# Patient Record
Sex: FEMALE | Race: WHITE | NOT HISPANIC OR LATINO | Employment: OTHER | URBAN - METROPOLITAN AREA
[De-identification: names, ages, dates, MRNs, and addresses within clinical notes are randomized per-mention and may not be internally consistent; named-entity substitution may affect disease eponyms.]

---

## 2017-01-14 ENCOUNTER — HOSPITAL ENCOUNTER (EMERGENCY)
Facility: HOSPITAL | Age: 52
Discharge: HOME | End: 2017-01-14
Attending: EMERGENCY MEDICINE
Payer: COMMERCIAL

## 2017-01-14 ENCOUNTER — APPOINTMENT (EMERGENCY)
Dept: RADIOLOGY | Facility: HOSPITAL | Age: 52
End: 2017-01-14
Payer: COMMERCIAL

## 2017-01-14 VITALS
WEIGHT: 155.2 LBS | SYSTOLIC BLOOD PRESSURE: 164 MMHG | DIASTOLIC BLOOD PRESSURE: 96 MMHG | OXYGEN SATURATION: 95 % | TEMPERATURE: 97.5 F | RESPIRATION RATE: 18 BRPM | HEART RATE: 100 BPM

## 2017-01-14 DIAGNOSIS — J01.90 ACUTE SINUSITIS: Primary | ICD-10-CM

## 2017-01-14 LAB — S PYO AG THROAT QL: NEGATIVE

## 2017-01-14 PROCEDURE — 87070 CULTURE OTHR SPECIMN AEROBIC: CPT | Performed by: PHYSICIAN ASSISTANT

## 2017-01-14 PROCEDURE — 87430 STREP A AG IA: CPT | Performed by: PHYSICIAN ASSISTANT

## 2017-01-14 PROCEDURE — 71020 HB CHEST X-RAY 2VW FRONTAL&LATL: CPT

## 2017-01-14 PROCEDURE — 99283 EMERGENCY DEPT VISIT LOW MDM: CPT

## 2017-01-14 RX ORDER — FLUTICASONE PROPIONATE 50 MCG
1 SPRAY, SUSPENSION (ML) NASAL DAILY
Qty: 1 BOTTLE | Refills: 0 | Status: ON HOLD | OUTPATIENT
Start: 2017-01-14 | End: 2017-11-15

## 2017-01-14 RX ORDER — AMOXICILLIN AND CLAVULANATE POTASSIUM 875; 125 MG/1; MG/1
1 TABLET, FILM COATED ORAL 2 TIMES DAILY
Qty: 10 TABLET | Refills: 0 | Status: SHIPPED | OUTPATIENT
Start: 2017-01-14 | End: 2017-01-19

## 2017-01-14 RX ORDER — IBUPROFEN 400 MG/1
400 TABLET ORAL EVERY 6 HOURS PRN
Qty: 20 TABLET | Refills: 0 | Status: ON HOLD | OUTPATIENT
Start: 2017-01-14 | End: 2017-11-15

## 2017-01-14 RX ORDER — GUAIFENESIN 600 MG
600 TABLET, EXTENDED RELEASE 12 HR ORAL EVERY 12 HOURS
Qty: 8 TABLET | Refills: 0 | Status: ON HOLD | OUTPATIENT
Start: 2017-01-14 | End: 2017-11-15

## 2017-01-14 RX ORDER — IBUPROFEN 200 MG
400 TABLET ORAL ONCE
Status: COMPLETED | OUTPATIENT
Start: 2017-01-14 | End: 2017-01-14

## 2017-01-14 RX ORDER — GUAIFENESIN 600 MG
600 TABLET, EXTENDED RELEASE 12 HR ORAL ONCE
Status: COMPLETED | OUTPATIENT
Start: 2017-01-14 | End: 2017-01-14

## 2017-01-14 RX ADMIN — IBUPROFEN 400 MG: 200 TABLET, FILM COATED ORAL at 16:32

## 2017-01-14 RX ADMIN — GUAIFENESIN 600 MG: 600 TABLET, EXTENDED RELEASE ORAL at 16:32

## 2017-01-16 LAB — BACTERIA THROAT CULT: NORMAL

## 2017-04-06 ENCOUNTER — ALLSCRIPTS OFFICE VISIT (OUTPATIENT)
Dept: OTHER | Facility: OTHER | Age: 52
End: 2017-04-06

## 2017-04-06 LAB — S PYO AG THROAT QL: POSITIVE

## 2017-04-10 ENCOUNTER — ALLSCRIPTS OFFICE VISIT (OUTPATIENT)
Dept: OTHER | Facility: OTHER | Age: 52
End: 2017-04-10

## 2017-07-07 ENCOUNTER — ALLSCRIPTS OFFICE VISIT (OUTPATIENT)
Dept: OTHER | Facility: OTHER | Age: 52
End: 2017-07-07

## 2017-07-07 LAB
BILIRUB UR QL STRIP: 0
CLARITY UR: NORMAL
COLOR UR: YELLOW
GLUCOSE (HISTORICAL): 0
HGB UR QL STRIP.AUTO: 0
KETONES UR STRIP-MCNC: 0 MG/DL
LEUKOCYTE ESTERASE UR QL STRIP: 0
NITRITE UR QL STRIP: 0
PH UR STRIP.AUTO: 6 [PH]
PROT UR STRIP-MCNC: 0 MG/DL
SP GR UR STRIP.AUTO: 1.01
UROBILINOGEN UR QL STRIP.AUTO: 0

## 2017-07-19 ENCOUNTER — ALLSCRIPTS OFFICE VISIT (OUTPATIENT)
Dept: OTHER | Facility: OTHER | Age: 52
End: 2017-07-19

## 2017-08-01 ENCOUNTER — ALLSCRIPTS OFFICE VISIT (OUTPATIENT)
Dept: OTHER | Facility: OTHER | Age: 52
End: 2017-08-01

## 2017-08-23 ENCOUNTER — ALLSCRIPTS OFFICE VISIT (OUTPATIENT)
Dept: OTHER | Facility: OTHER | Age: 52
End: 2017-08-23

## 2017-08-30 ENCOUNTER — TRANSCRIBE ORDERS (OUTPATIENT)
Dept: ADMINISTRATIVE | Facility: HOSPITAL | Age: 52
End: 2017-08-30

## 2017-08-30 ENCOUNTER — ALLSCRIPTS OFFICE VISIT (OUTPATIENT)
Dept: OTHER | Facility: OTHER | Age: 52
End: 2017-08-30

## 2017-08-30 DIAGNOSIS — R10.9 ABDOMINAL PAIN, UNSPECIFIED SITE: Primary | ICD-10-CM

## 2017-08-30 DIAGNOSIS — R10.9 ABDOMINAL PAIN: ICD-10-CM

## 2017-08-30 DIAGNOSIS — F30.9 MANIC EPISODE (HCC): ICD-10-CM

## 2017-09-05 ENCOUNTER — TRANSCRIBE ORDERS (OUTPATIENT)
Dept: LAB | Facility: CLINIC | Age: 52
End: 2017-09-05

## 2017-09-05 ENCOUNTER — APPOINTMENT (OUTPATIENT)
Dept: LAB | Facility: CLINIC | Age: 52
End: 2017-09-05
Payer: COMMERCIAL

## 2017-09-05 ENCOUNTER — GENERIC CONVERSION - ENCOUNTER (OUTPATIENT)
Dept: OTHER | Facility: OTHER | Age: 52
End: 2017-09-05

## 2017-09-05 DIAGNOSIS — I10 ESSENTIAL HYPERTENSION, MALIGNANT: ICD-10-CM

## 2017-09-05 DIAGNOSIS — D64.9 ANEMIA, UNSPECIFIED: ICD-10-CM

## 2017-09-05 DIAGNOSIS — F30.9 MANIC EPISODE (HCC): ICD-10-CM

## 2017-09-05 DIAGNOSIS — D64.9 ANEMIA, UNSPECIFIED: Primary | ICD-10-CM

## 2017-09-05 LAB
ALBUMIN SERPL BCP-MCNC: 3.7 G/DL (ref 3.5–5)
ALP SERPL-CCNC: 75 U/L (ref 46–116)
ALT SERPL W P-5'-P-CCNC: 25 U/L (ref 12–78)
ANION GAP SERPL CALCULATED.3IONS-SCNC: 6 MMOL/L (ref 4–13)
AST SERPL W P-5'-P-CCNC: 19 U/L (ref 5–45)
BASOPHILS # BLD AUTO: 0.03 THOUSANDS/ΜL (ref 0–0.1)
BASOPHILS NFR BLD AUTO: 1 % (ref 0–1)
BILIRUB SERPL-MCNC: 0.29 MG/DL (ref 0.2–1)
BUN SERPL-MCNC: 13 MG/DL (ref 5–25)
CALCIUM SERPL-MCNC: 9.2 MG/DL (ref 8.3–10.1)
CHLORIDE SERPL-SCNC: 105 MMOL/L (ref 100–108)
CHOLEST SERPL-MCNC: 228 MG/DL (ref 50–200)
CO2 SERPL-SCNC: 28 MMOL/L (ref 21–32)
CREAT SERPL-MCNC: 0.92 MG/DL (ref 0.6–1.3)
EOSINOPHIL # BLD AUTO: 0.08 THOUSAND/ΜL (ref 0–0.61)
EOSINOPHIL NFR BLD AUTO: 2 % (ref 0–6)
ERYTHROCYTE [DISTWIDTH] IN BLOOD BY AUTOMATED COUNT: 13.2 % (ref 11.6–15.1)
GFR SERPL CREATININE-BSD FRML MDRD: 73 ML/MIN/1.73SQ M
GLUCOSE P FAST SERPL-MCNC: 109 MG/DL (ref 65–99)
HCT VFR BLD AUTO: 44.1 % (ref 34.8–46.1)
HDLC SERPL-MCNC: 42 MG/DL (ref 40–60)
HGB BLD-MCNC: 15 G/DL (ref 11.5–15.4)
LDLC SERPL CALC-MCNC: 137 MG/DL (ref 0–100)
LYMPHOCYTES # BLD AUTO: 2.3 THOUSANDS/ΜL (ref 0.6–4.47)
LYMPHOCYTES NFR BLD AUTO: 47 % (ref 14–44)
MCH RBC QN AUTO: 30.3 PG (ref 26.8–34.3)
MCHC RBC AUTO-ENTMCNC: 34 G/DL (ref 31.4–37.4)
MCV RBC AUTO: 89 FL (ref 82–98)
MONOCYTES # BLD AUTO: 0.31 THOUSAND/ΜL (ref 0.17–1.22)
MONOCYTES NFR BLD AUTO: 6 % (ref 4–12)
NEUTROPHILS # BLD AUTO: 2.13 THOUSANDS/ΜL (ref 1.85–7.62)
NEUTS SEG NFR BLD AUTO: 44 % (ref 43–75)
NRBC BLD AUTO-RTO: 0 /100 WBCS
PLATELET # BLD AUTO: 262 THOUSANDS/UL (ref 149–390)
PMV BLD AUTO: 10.7 FL (ref 8.9–12.7)
POTASSIUM SERPL-SCNC: 4.3 MMOL/L (ref 3.5–5.3)
PROT SERPL-MCNC: 8 G/DL (ref 6.4–8.2)
RBC # BLD AUTO: 4.95 MILLION/UL (ref 3.81–5.12)
SODIUM SERPL-SCNC: 139 MMOL/L (ref 136–145)
TRIGL SERPL-MCNC: 246 MG/DL
TSH SERPL DL<=0.05 MIU/L-ACNC: 1.17 UIU/ML (ref 0.36–3.74)
WBC # BLD AUTO: 4.86 THOUSAND/UL (ref 4.31–10.16)

## 2017-09-05 PROCEDURE — 36415 COLL VENOUS BLD VENIPUNCTURE: CPT

## 2017-09-05 PROCEDURE — 80053 COMPREHEN METABOLIC PANEL: CPT

## 2017-09-05 PROCEDURE — 84443 ASSAY THYROID STIM HORMONE: CPT

## 2017-09-05 PROCEDURE — 85025 COMPLETE CBC W/AUTO DIFF WBC: CPT

## 2017-09-05 PROCEDURE — 80061 LIPID PANEL: CPT

## 2017-09-06 ENCOUNTER — GENERIC CONVERSION - ENCOUNTER (OUTPATIENT)
Dept: OTHER | Facility: OTHER | Age: 52
End: 2017-09-06

## 2017-09-08 ENCOUNTER — HOSPITAL ENCOUNTER (OUTPATIENT)
Dept: CT IMAGING | Facility: HOSPITAL | Age: 52
Discharge: HOME | End: 2017-09-08
Payer: COMMERCIAL

## 2017-09-08 DIAGNOSIS — R10.9 ABDOMINAL PAIN: ICD-10-CM

## 2017-09-08 PROCEDURE — 74177 CT ABD & PELVIS W/CONTRAST: CPT

## 2017-09-08 RX ADMIN — IOHEXOL 100 ML: 350 INJECTION, SOLUTION INTRAVENOUS at 17:13

## 2017-09-11 ENCOUNTER — GENERIC CONVERSION - ENCOUNTER (OUTPATIENT)
Dept: OTHER | Facility: OTHER | Age: 52
End: 2017-09-11

## 2017-09-14 ENCOUNTER — GENERIC CONVERSION - ENCOUNTER (OUTPATIENT)
Dept: OTHER | Facility: OTHER | Age: 52
End: 2017-09-14

## 2017-09-19 ENCOUNTER — ALLSCRIPTS OFFICE VISIT (OUTPATIENT)
Dept: OTHER | Facility: OTHER | Age: 52
End: 2017-09-19

## 2017-09-19 ENCOUNTER — TRANSCRIBE ORDERS (OUTPATIENT)
Dept: ADMINISTRATIVE | Facility: HOSPITAL | Age: 52
End: 2017-09-19

## 2017-09-19 DIAGNOSIS — N20.0 URIC ACID NEPHROLITHIASIS: Primary | ICD-10-CM

## 2017-10-05 ENCOUNTER — GENERIC CONVERSION - ENCOUNTER (OUTPATIENT)
Dept: FAMILY MEDICINE CLINIC | Facility: CLINIC | Age: 52
End: 2017-10-05

## 2017-10-05 ENCOUNTER — GENERIC CONVERSION - ENCOUNTER (OUTPATIENT)
Dept: OTHER | Facility: OTHER | Age: 52
End: 2017-10-05

## 2017-10-19 DIAGNOSIS — N20.0 CALCULUS OF KIDNEY: ICD-10-CM

## 2017-10-30 ENCOUNTER — APPOINTMENT (OUTPATIENT)
Dept: RADIOLOGY | Facility: MEDICAL CENTER | Age: 52
End: 2017-10-30
Payer: COMMERCIAL

## 2017-10-30 ENCOUNTER — TRANSCRIBE ORDERS (OUTPATIENT)
Dept: ADMINISTRATIVE | Facility: HOSPITAL | Age: 52
End: 2017-10-30

## 2017-10-30 ENCOUNTER — HOSPITAL ENCOUNTER (OUTPATIENT)
Dept: ULTRASOUND IMAGING | Facility: MEDICAL CENTER | Age: 52
Discharge: HOME | End: 2017-10-30
Payer: COMMERCIAL

## 2017-10-30 DIAGNOSIS — N20.0 CALCULUS OF KIDNEY: ICD-10-CM

## 2017-10-30 PROCEDURE — 76770 US EXAM ABDO BACK WALL COMP: CPT

## 2017-10-30 PROCEDURE — 74000 HB X-RAY EXAM OF ABDOMEN (SINGLE ANTEROPOSTERIOR VIEW): CPT

## 2017-10-31 ENCOUNTER — GENERIC CONVERSION - ENCOUNTER (OUTPATIENT)
Dept: OTHER | Facility: OTHER | Age: 52
End: 2017-10-31

## 2017-11-07 ENCOUNTER — ALLSCRIPTS OFFICE VISIT (OUTPATIENT)
Dept: OTHER | Facility: OTHER | Age: 52
End: 2017-11-07

## 2017-11-08 ENCOUNTER — ALLSCRIPTS OFFICE VISIT (OUTPATIENT)
Dept: OTHER | Facility: OTHER | Age: 52
End: 2017-11-08

## 2017-11-08 NOTE — CONSULTS
Assessment  1  Family history of colon cancer (V16 0) (Z80 0)   2  Alternating constipation and diarrhea (787 99) (R19 8)    Plan  Back muscle spasm    · Naproxen 500 MG Oral Tablet   Rx By: Juventino Villareal; Dispense: 30 Days ; #:60 Tablet; Refill: 1;For: Back muscle spasm; FRANCES = N; Sent To: [place] [address]; Last Updated By: Nancy Araya; 11/7/2017 8:59:50 AM  Family history of colon cancer    · MoviPrep 100 GM Oral Solution Reconstituted; USE AS DIRECTED   Rx By: Lela Bardales; Dispense: 0 Days ; #:1 Solution Reconstituted; Refill: 0;For: Family history of colon cancer; FRANCES = N; Verified Transmission to [place] [address]; Last Updated By: System, [employer]; 11/7/2017 10:07:08 AM   · Follow-up PRN Evaluation and Treatment  Follow-up  Status: Complete  Done:  [id number]   Ordered; For: Family history of colon cancer; Ordered By: Lela Bardales Performed:  Due: 97KTK4016   · COLONOSCOPY (GI, SURG); Status:Hold For - Scheduling; Requested YJF:[id number];    Perform:[facility]; Order Comments:[place]; IMK:[id number]; Last Updated Ramy Yoav; 11/7/2017 10:09:29 AM;Ordered; For:Family history of colon cancer; Ordered By:Yareli Rosales;    Discussion/Summary  Discussion Summary:   Family history of colon cancer: Her father had colon cancer so she is overdue for a screening colonoscopy  I spoke to her about the benefits and risks of the procedure as well as instructions of the bowel preparation and answered all of her questions  diarrhea and constipation: She has alternating diarrhea and constipation and since this has been chronic it is most likely due to irritable bowel syndrome  I spoke to her about the importance of a low FODMAP diet and minimizing stress  Chief Complaint  Chief Complaint Free Text Note Form: Colon consult  Pt c/o abd pain and diarrhea/constipation  Referred by Dr Mouna Hare        History of Present Illness  HPI: She presents for a new patient evaluation because of a family history of colon cancer in her father  She has never previously had a colonoscopy and she denies any family history of colon polyps  She denies abdominal pain and weight loss but has occasional intermittent alternating diarrhea and constipation  This is not been a change in her bowel habits that she has had these alternating symptoms for many years  She denies any upper GI symptoms such as reflux, dysphagia, and vomiting  History Reviewed: The history was obtained today from the patient and I agree with the documented history  Review of Systems  Complete-Female GI Adult:   Constitutional: No fever, no chills, feels well, no tiredness, no recent weight gain or weight loss  Eyes: No complaints of eye pain, no red eyes, no eyesight problems, no discharge, no dry eyes, no itching of eyes  ENT: no complaints of earache, no loss of hearing, no nose bleeds, no nasal discharge, no sore throat, no hoarseness  Cardiovascular: No complaints of slow heart rate, no fast heart rate, no chest pain, no palpitations, no leg claudication, no lower extremity edema  Respiratory: No complaints of shortness of breath, no wheezing, no cough, no SOB on exertion, no orthopnea, no PND  Gastrointestinal: No complaints of abdominal pain, no constipation, no nausea or vomiting, no diarrhea, no bloody stools  Genitourinary: No complaints of dysuria, no incontinence, no pelvic pain, no dysmenorrhea, no vaginal discharge or bleeding  Musculoskeletal: No complaints of arthralgias, no myalgias, no joint swelling or stiffness, no limb pain or swelling  Integumentary: No complaints of skin rash or lesions, no itching, no skin wounds, no breast pain or lump  Neurological: No complaints of headache, no confusion, no convulsions, no numbness, no dizziness or fainting, no tingling, no limb weakness, no difficulty walking     Psychiatric: Not suicidal, no sleep disturbance, no anxiety or depression, no change in personality, no emotional problems  Endocrine: No complaints of proptosis, no hot flashes, no muscle weakness, no deepening of the voice, no feelings of weakness  Hematologic/Lymphatic: No complaints of swollen glands, no swollen glands in the neck, does not bleed easily, does not bruise easily  ROS Reviewed:   ROS reviewed  Active Problems  1  Abdominal pain of multiple sites (789 09) (R10 9)   2  Anemia (285 9) (D64 9)   3  Back muscle spasm (724 8) (M62 830)   4  Bipolar I disorder, single manic episode (296 00) (F30 9)   5  Bloating (787 3) (R14 0)   6  Chronic migraine without aura (346 70) (G43 709)   7  Current every day smoker (305 1) (F17 200)   8  Encounter for screening colonoscopy (V76 51) (Z12 11)   9  Familial cancer of breast (174 9) (C50 919)   10  Female pelvic pain (625 9) (R10 2)   11  Flu vaccine need (V04 81) (Z23)   12  Flu vaccine need (V04 81) (Z23)   13  Hypertension (401 9) (I10)   14  Nephrolithiasis (592 0) (N20 0)   15  Overactive bladder (596 51) (N32 81)   16  Stress incontinence, female (625 6) (N39 3)   17  Vaccine for tetanus toxoid (V03 7) (Z23)   18  Vertigo (780 4) (R42)    Past Medical History  1  History of Abdominal mass, LLQ (left lower quadrant) (789 34) (R19 04)   2  History of Abdominal pain, LLQ (left lower quadrant) (789 04) (R10 32)   3  History of Anxiety (300 00) (F41 9)   4  History of Atypical chest pain (786 59) (R07 89)   5  History of Biceps tendinitis of left shoulder (726 12) (M75 22)   6  History of Bipolar disorder (296 80) (F31 9)   7  History of Breast pain (611 71) (N64 4)   8  Cellulitis diffuse, face (682 0) (L03 211)   9  History of Cervicalgia (723 1) (M54 2)   10  Delivery normal (650) (O80,Z37 9)   11  History of Depression with anxiety (300 4) (F41 8)   12  History of Esophageal reflux (530 81) (K21 9)   13  History of Fatigue (780 79) (R53 83)   14  History of Flu vaccine need (V04 81) (Z23)   15  History of Head Injury (959 01)   16   History of Hematoma of leg (924 5) (S80 10XA)   17  History of acute conjunctivitis (V12 49) (Z86 69)   18  History of acute sinusitis (V12 69) (Z87 09)   19  History of fracture of humerus (V15 51) (Z87 81)   20  History of gastroesophageal reflux (GERD) (V12 79) (Z87 19)   21  History of headache (V13 89) (Z87 898)   22  History of heartburn (V12 79) (Z87 898)   23  History of migraine (V12 49) (Z86 69)   24  History of polyuria (V13 00) (Z87 448)   25  History of pregnancy (V13 29)   26  History of sore throat (V12 60) (Z87 09)   27  History of streptococcal pharyngitis (V12 09) (Z87 09)   28  History of urinary incontinence (V13 09) (Z87 898)   29  History of Knee Pain Elicited By Motion   30  History of Migraine headache (346 90) (G43 909)   31  History of Previous Psychiatric Treatment Including Hospitalization   32  History of Red eye (379 93) (H57 8)   33  History of Skull Fracture (803 00)   34  History of Urinary frequency (788 41) (R35 0)   35  History of Visit for pre-operative examination (V72 84) (R05 235)  Active Problems And Past Medical History Reviewed: The active problems and past medical history were reviewed and updated today  Surgical History  1  History of Ablation Of Vaginal Lesion(S)   2  History of Biopsy Bone Open   3  History of Breast Surgery Lumpectomy   4  History of Dilation And Curettage   5  History of Elbow Surgery   6  History of Hysteroscopy With Endometrial Ablation   7  History of Mid-Urethral Sling Operation   8  History of Ovarian Cystectomy   9  History of Ovarian Cystectomy Left   10  History of Ovarian Cystectomy Right   11  History of Shoulder Surgery Right   12  History of Surgical Treatment For    13  History of Treatment Of Fracture Of The Humerus   14  History of Tubal Ligation  Surgical History Reviewed: The surgical history was reviewed and updated today  Family History  Mother    1  Family history of Breast cancer   2   Family history of Breast Cancer (V16 3)   3  Family history of Hypertension (V17 49)  Father    4  Family history of Asthma (V17 5)   5  Family history of Coronary Artery Disease (V17 49)   6  Family history of diabetes mellitus (V18 0) (Z83 3)   7  Family history of Hypertension (V17 49)   8  Family history of Type 2 Diabetes Mellitus  Sister    5  Family history of Breast cancer  Family History    10  Family history of Breast cancer   11  Family history of Diabetes   12  Family history of Heart disease   13  Family history of Hypertension  Family History Reviewed: The family history was reviewed and updated today  Social History   · Active smoker (305 1) (Z72 0)   · Being A Social Drinker   · Caffeine use (V49 89) (F15 90)   · Current every day smoker (305 1) (F17 200)   · Lives with adult children   ·    · Moderate alcohol use   · No drug use  Social History Reviewed: The social history was reviewed and updated today  Current Meds   1  DiphenhydrAMINE HCl - 25 MG Oral Tablet; TAKE 1 TABLET Every 4-6 hours; Therapy: 61JYL1657 to (Last Rx:05Oct2017)  Requested for: 05Oct2017; Status: ACTIVE -   Transmit to Pharmacy - Awaiting Verification Ordered   2  KlonoPIN 2 MG Oral Tablet Recorded   3  LaMICtal 200 MG Oral Tablet; TAKE 1 TABLET AT BEDTIME; Therapy: 03RZN6686 to (Evaluate:31Svf4310) Recorded   4  Meclizine HCl - 25 MG Oral Tablet; TAKE 1 TABLET EVERY 8 TO 12 HOURS AS NEEDED; Therapy: 70OAK2501 to ()  Requested for: 05Oct2017; Last   Rx:05Oct2017 Ordered   5  Methocarbamol 500 MG Oral Tablet; TAKE 1 TABLET 3 TIMES DAILY; Therapy: 36SHC4110 to (Evaluate:10Nov2017)  Requested for: [phone]; Last   Rx:17Nkp0188; Status: ACTIVE - Transmit to Pharmacy - Awaiting Verification Ordered   6  Metoprolol Succinate ER 25 MG Oral Tablet Extended Release 24 Hour; TAKE 1 TABLET   DAILY; Therapy: 04ZOI7482 to (Evaluate:19Itx1395)  Requested for: 10Ozp0551; Last   Rx:38Khn2008 Ordered   7   Naproxen 500 MG Oral Tablet; Take one tablet by mouth every 12 hours as needed; Therapy: 67ZZH0616 to (Evaluate:87Rpr0761)  Requested for: [phone]; Last   Rx:31Oct2017; Status: ACTIVE - Transmit to Pharmacy - Awaiting Verification Ordered   8  Oxybutynin Chloride ER 5 MG Oral Tablet Extended Release 24 Hour; TAKE 1 TABLET   DAILY; Therapy: 51Exk5800 to (Evaluate:17Aug2018); Last Rx:83Gng9468 Ordered   9  TraZODone HCl - 150 MG Oral Tablet; Therapy: (Recorded:01Mar2016) to Recorded  Medication List Reviewed: The medication list was reviewed and updated today  Allergies  1  Codeine Sulfate TABS   2  Erythromycin TABS   3  Percocet TABS    Vitals  Vital Signs    Recorded: 91NJI5198 08:59AM   Temperature 97 7 F   Heart Rate 73   Systolic 061   Diastolic 84   Height 5 ft 2 in   Weight 162 lb    BMI Calculated 29 63   BSA Calculated 1 75   O2 Saturation 95     Physical Exam    Constitutional   General appearance: No acute distress, well appearing and well nourished  Eyes   Conjunctiva and lids: No swelling, erythema or discharge  Pupils and irises: Equal, round and reactive to light  Ears, Nose, Mouth, and Throat   External inspection of ears and nose: Normal     Nasal mucosa, septum, and turbinates: Normal without edema or erythema  Oropharynx: Normal with no erythema, edema, exudate or lesions  Pulmonary   Respiratory effort: No increased work of breathing or signs of respiratory distress  Auscultation of lungs: Clear to auscultation  Cardiovascular   Auscultation of heart: Normal rate and rhythm, normal S1 and S2, without murmurs  Examination of extremities for edema and/or varicosities: Normal     Abdomen   Abdomen: Non-tender, no masses  Liver and spleen: No hepatomegaly or splenomegaly  Lymphatic   Palpation of lymph nodes in neck: No lymphadenopathy  Musculoskeletal   Gait and station: Normal     Digits and nails: Normal without clubbing or cyanosis      Inspection/palpation of joints, bones, and muscles: Normal     Skin   Skin and subcutaneous tissue: Normal without rashes or lesions      Psychiatric   Orientation to person, place, and time: Normal     Mood and affect: Normal          Future Appointments    Date/Time Provider Specialty Site   11/08/2017 02:30 PM [facility]  [address]     Signatures   Electronically signed by : Holly Osborne MD; Nov 7 2017 10:16AM EST                       (Author)

## 2017-11-10 NOTE — PROGRESS NOTES
Assessment    1  Nephrolithiasis (592 0) (N20 0)    Discussion/Summary  Discussion Summary:   32-year-old female managed by Dr Allie Horvath  Nephrolithiasis  with the patient that she passed her 5 mm UVJ calculi  She does have a remaining 2 mm nonobstructing stone in the lower pole of her left kidney  We'll observe this  She will follow up in 1 year with a KUB and ultrasound prior  Stress proper hydration with 60 oz of fluid daily  The patient understands and agrees to this treatment plan  All questions and concerns have been addressed answered  Goals and Barriers: The patient has the current Goals: Maintain adequate hydration  The patent has the current Barriers: None  Patient's Capacity to Self-Care: Patient is able to Self-Care  Chief Complaint  Chief Complaint Free Text Note Form: pt here for nephrolithiasis      History of Present Illness  HPI: Samson Garcia is a 32-year-old female here for follow-up evaluation of a 5 mm distal ureterovesicular junction stone  She had a KUB and ultrasound obtained prior to her visit revealing that she passes calculi  She has a remaining 2 mm nonobstructing stone in the lower pole of the left kidney  She presents today with no lower urinary tract complaints aside for occasional urgency  This is not bothersome to her  Review of Systems  Complete-Female Urology:  Constitutional: No fever, no chills, feels well, no tiredness, no recent weight gain or weight loss  Respiratory: No complaints of shortness of breath, no wheezing, no cough, no SOB on exertion, no orthopnea, no PND  Cardiovascular: No complaints of slow heart rate, no fast heart rate, no chest pain, no palpitations, no leg claudication, no lower extremity edema  Gastrointestinal: No complaints of abdominal pain, no constipation, no nausea or vomiting, no diarrhea, no bloody stools    Genitourinary: feelings of urinary urgency-- (occasional),-- Empty sensation-- and-- stream quality good, but-- no dysuria,-- no urinary hesitancy,-- no incontinence,-- no hematuria-- and-- no nocturia  Musculoskeletal: No complaints of arthralgias, no myalgias, no joint swelling or stiffness, no limb pain or swelling  Integumentary: No complaints of skin rash or lesions, no itching, no skin wounds, no breast pain or lump  Hematologic/Lymphatic: No complaints of swollen glands, no swollen glands in the neck, does not bleed easily, does not bruise easily  Neurological: No complaints of headache, no confusion, no convulsions, no numbness, no dizziness or fainting, no tingling, no limb weakness, no difficulty walking  ROS Reviewed:   ROS reviewed  Active Problems  1  Abdominal pain of multiple sites (789 09) (R10 9)   2  Alternating constipation and diarrhea (787 99) (R19 8)   3  Anemia (285 9) (D64 9)   4  Back muscle spasm (724 8) (M62 830)   5  Bipolar I disorder, single manic episode (296 00) (F30 9)   6  Bloating (787 3) (R14 0)   7  Chronic migraine without aura (346 70) (G43 709)   8  Current every day smoker (305 1) (F17 200)   9  Encounter for screening colonoscopy (V76 51) (Z12 11)   10  Familial cancer of breast (174 9) (C50 919)   11  Family history of colon cancer (V16 0) (Z80 0)   12  Female pelvic pain (625 9) (R10 2)   13  Flu vaccine need (V04 81) (Z23)   14  Flu vaccine need (V04 81) (Z23)   15  Hypertension (401 9) (I10)   16  Nephrolithiasis (592 0) (N20 0)   17  Overactive bladder (596 51) (N32 81)   18  Stress incontinence, female (625 6) (N39 3)   19  Vaccine for tetanus toxoid (V03 7) (Z23)   20  Vertigo (780 4) (R42)    Past Medical History  1  History of Abdominal mass, LLQ (left lower quadrant) (789 34) (R19 04)   2  History of Abdominal pain, LLQ (left lower quadrant) (789 04) (R10 32)   3  History of Anxiety (300 00) (F41 9)   4  History of Atypical chest pain (786 59) (R07 89)   5  History of Biceps tendinitis of left shoulder (726 12) (M75 22)   6  History of Bipolar disorder (296 80) (F31 9)   7  History of Breast pain (611 71) (N64 4)   8  Cellulitis diffuse, face (682 0) (L03 211)   9  History of Cervicalgia (723 1) (M54 2)   10  Delivery normal (650) (O80,Z37 9)   11  History of Depression with anxiety (300 4) (F41 8)   12  History of Esophageal reflux (530 81) (K21 9)   13  History of Fatigue (780 79) (R53 83)   14  History of Flu vaccine need (V04 81) (Z23)   15  History of Head Injury (959 01)   16  History of Hematoma of leg (924 5) (S80 10XA)   17  History of acute conjunctivitis (V12 49) (Z86 69)   18  History of acute sinusitis (V12 69) (Z87 09)   19  History of fracture of humerus (V15 51) (Z87 81)   20  History of gastroesophageal reflux (GERD) (V12 79) (Z87 19)   21  History of headache (V13 89) (Z87 898)   22  History of heartburn (V12 79) (Z87 898)   23  History of migraine (V12 49) (Z86 69)   24  History of polyuria (V13 00) (Z87 448)   25  History of pregnancy (V13 29)   26  History of sore throat (V12 60) (Z87 09)   27  History of streptococcal pharyngitis (V12 09) (Z87 09)   28  History of urinary incontinence (V13 09) (Z87 898)   29  History of Knee Pain Elicited By Motion   30  History of Migraine headache (346 90) (G43 909)   31  History of Previous Psychiatric Treatment Including Hospitalization   32  History of Red eye (379 93) (H57 8)   33  History of Skull Fracture (803 00)   34  History of Urinary frequency (788 41) (R35 0)   35  History of Visit for pre-operative examination (V72 84) (U04 812)  Active Problems And Past Medical History Reviewed: The active problems and past medical history were reviewed and updated today  Surgical History  1  History of Ablation Of Vaginal Lesion(S)   2  History of Biopsy Bone Open   3  History of Breast Surgery Lumpectomy   4  History of Dilation And Curettage   5  History of Elbow Surgery   6  History of Hysteroscopy With Endometrial Ablation   7  History of Mid-Urethral Sling Operation   8  History of Ovarian Cystectomy   9   History of Ovarian Cystectomy Left   10  History of Ovarian Cystectomy Right   11  History of Shoulder Surgery Right   12  History of Surgical Treatment For    13  History of Treatment Of Fracture Of The Humerus   14  History of Tubal Ligation  Surgical History Reviewed: The surgical history was reviewed and updated today  Family History  Mother    1  Family history of Breast cancer   2  Family history of Breast Cancer (V16 3)   3  Family history of Hypertension (V17 49)  Father    4  Family history of Asthma (V17 5)   5  Family history of Coronary Artery Disease (V17 49)   6  Family history of diabetes mellitus (V18 0) (Z83 3)   7  Family history of Hypertension (V17 49)   8  Family history of Type 2 Diabetes Mellitus  Sister    5  Family history of Breast cancer  Family History    10  Family history of Breast cancer   11  Family history of Diabetes   12  Family history of Heart disease   13  Family history of Hypertension  Family History Reviewed: The family history was reviewed and updated today  Social History     · Active smoker (305 1) (Z72 0)   · Being A Social Drinker   · Caffeine use (V49 89) (F15 90)   · Current every day smoker (305 1) (F17 200)   · Lives with adult children   ·    · Moderate alcohol use   · No drug use  Social History Reviewed: The social history was reviewed and updated today  The social history was reviewed and is unchanged  Current Meds   1  DiphenhydrAMINE HCl - 25 MG Oral Tablet; TAKE 1 TABLET Every 4-6 hours; Therapy: 62YSM1875 to (Last Rx:2017)  Requested for: 2017; Status: ACTIVE - Transmit to Pharmacy - Awaiting Verification Ordered   2  KlonoPIN 2 MG Oral Tablet Recorded   3  LaMICtal 200 MG Oral Tablet; TAKE 1 TABLET AT BEDTIME; Therapy: 11KIS8796 to (Evaluate:75Siq5805) Recorded   4  Meclizine HCl - 25 MG Oral Tablet; TAKE 1 TABLET EVERY 8 TO 12 HOURS AS NEEDED;  Therapy: 39MBJ0374 to ([phone])  Requested for: 32DIZ1390; Last Rx: 57VRL4119 Ordered   5  Methocarbamol 500 MG Oral Tablet; TAKE 1 TABLET 3 TIMES DAILY; Therapy: 74UAJ8774 to (Evaluate:10Nov2017)  Requested for: [phone]; Last Rx:31Oct2017; Status: ACTIVE - Transmit to Pharmacy - Awaiting Verification Ordered   6  Metoprolol Succinate ER 25 MG Oral Tablet Extended Release 24 Hour; TAKE 1 TABLET DAILY; Therapy: 71VBB2896 to (Evaluate:49Iir9411)  Requested for: 96Cgi5085; Last Rx:53Yxu3711 Ordered   7  MoviPrep 100 GM Oral Solution Reconstituted; USE AS DIRECTED; Therapy: 72RWN7058 to (Last RE:25HZJ1870)  Requested for: 73VNS8337 Ordered   8  Oxybutynin Chloride ER 5 MG Oral Tablet Extended Release 24 Hour; TAKE 1 TABLET DAILY; Therapy: 49Rpm4081 to (Evaluate:17Aug2018); Last Rx:65Dfz6377 Ordered   9  TraZODone HCl - 150 MG Oral Tablet; Therapy: (Recorded:01Mar2016) to Recorded  Medication List Reviewed: The medication list was reviewed and updated today  Allergies  1  Codeine Sulfate TABS   2  Erythromycin TABS   3  Percocet TABS    Vitals  Vital Signs    Recorded: 29WYR0300 02:32PM   Heart Rate 87   Systolic 501   Diastolic 80   Height 5 ft 2 in   Weight 162 lb    BMI Calculated 29 63   BSA Calculated 1 75       Physical Exam   Constitutional  General appearance: No acute distress, well appearing and well nourished  Eyes  Conjunctiva and lids: No swelling, erythema or discharge  Ears, Nose, Mouth, and Throat  Hearing: Normal    Pulmonary  Respiratory effort: No increased work of breathing or signs of respiratory distress  Abdomen  Abdomen: Non-tender, no masses     Musculoskeletal  Gait and station: Normal    Psychiatric  Mood and affect: Normal        Results/Data  * XR ABDOMEN 1 VIEW KUB [id number] 04:23PM Marelliott Crutch Order Number: [id number]     Test Name Result Flag Reference   XR ABDOMEN 1 VIEW KUB (Report)       ABDOMEN   INDICATION: Abdominal distention   COMPARISON: Abdominal radiograph 9/29/2005, CT abdomen pelvis 9/8/2017   VIEWS: AP supine IMAGES: 2   FINDINGS:   There is a nonobstructive bowel gas pattern  No discernible free air on this supine study  Upright or left lateral decubitus imaging is more sensitive to detect subtle free air in the appropriate setting  2 mm punctate density overlying the left renal shadow compatible with small calculus  Previously noted left ureteral calculus is not definitively identified  Visualized lung bases are clear  Visualized osseous structures are unremarkable for the patient's age  IMPRESSION:   Stable punctate 2 mm left renal calculus  Previously identified left ureteral calculus is not definitively identified  Workstation performed: [id number]   Signed by:  Mckay Hernandez MD  11/1/17     US KIDNEY AND BLADDER [id number] 04:10PM Mague Handing Order Number: [id number]  Performing Comments: With ureteral jets please   - Patient Instructions: To schedule this appointment, please contact Central Scheduling at [phone]  **  [facility] **                 REPORT TO [address]  PLEASE HAVE FULL                  BLADDER FOR TEST  ARRIVE 15 MINUTES                 EARLY TO REGISTER     Test Name Result Flag Reference   US KIDNEY AND BLADDER (Report)       RENAL ULTRASOUND   INDICATION: History of left ureteral calculus with hydronephrosis  Follow-up exam    COMPARISON: CT scan 9/8/2017   TECHNIQUE:  Ultrasound of the retroperitoneum was performed with a curvilinear transducer utilizing volumetric sweeps and still imaging techniques  FINDINGS:   KIDNEYS:  Symmetric and normal size  Right kidney: 10 1 x 3 8 cm  Normal echogenicity and contour  No suspicious masses detected  No hydronephrosis  No shadowing calculi  No perinephric fluid collections  Left kidney: 11 4 x 5 0 cm  Normal echogenicity and contour  No suspicious masses detected  No hydronephrosis  No shadowing calculi  No perinephric fluid collections     URETERS: Nonvisualized  BLADDER:   Normally distended  No focal thickening or mass lesions  Bilateral ureteral jets detected  IMPRESSION:   Normal      Workstation performed: [id number]   Signed by:   Carmella Waite MD  11/1/17     Future Appointments    Date/Time Provider Specialty Site   11/15/2017 02:30 PM Joseph Moy MD Gastroenterology Adult [facility] ENDOSCOPY   11/09/2018 01:30 PM Genjeff [facility] Urology  [facility] END       Signatures   Electronically signed by : Radha Lama, ; Nov 8 2017  2:51PM EST                       (Author)    Electronically signed by : COLEEN Maldonado ; Nov 9 2017  9:24AM EST

## 2017-11-14 RX ORDER — NAPROXEN 500 MG/1
500 TABLET ORAL EVERY 12 HOURS PRN
Status: ON HOLD | COMMUNITY
End: 2017-11-15

## 2017-11-14 RX ORDER — CLONAZEPAM 2 MG/1
2 TABLET ORAL DAILY
COMMUNITY

## 2017-11-14 RX ORDER — LAMOTRIGINE 200 MG/1
400 TABLET ORAL
COMMUNITY
End: 2019-09-24

## 2017-11-14 RX ORDER — MECLIZINE HYDROCHLORIDE 25 MG/1
TABLET ORAL
Status: ON HOLD | COMMUNITY
End: 2017-11-15

## 2017-11-14 RX ORDER — OXYBUTYNIN CHLORIDE 5 MG/1
5 TABLET, EXTENDED RELEASE ORAL DAILY
Status: ON HOLD | COMMUNITY
End: 2017-11-15

## 2017-11-14 RX ORDER — TRAZODONE HYDROCHLORIDE 150 MG/1
150 TABLET ORAL DAILY
COMMUNITY
End: 2018-12-18

## 2017-11-14 RX ORDER — METOPROLOL SUCCINATE 25 MG/1
25 TABLET, EXTENDED RELEASE ORAL DAILY
COMMUNITY
End: 2018-04-17 | Stop reason: SDUPTHER

## 2017-11-14 RX ORDER — METHOCARBAMOL 500 MG/1
500 TABLET, FILM COATED ORAL 3 TIMES DAILY
Status: ON HOLD | COMMUNITY
End: 2017-11-15

## 2017-11-15 ENCOUNTER — HOSPITAL ENCOUNTER (OUTPATIENT)
Facility: MEDICAL CENTER | Age: 52
Setting detail: OUTPATIENT SURGERY
Discharge: HOME | End: 2017-11-15
Attending: INTERNAL MEDICINE | Admitting: INTERNAL MEDICINE
Payer: COMMERCIAL

## 2017-11-15 ENCOUNTER — ANESTHESIA (OUTPATIENT)
Dept: GASTROENTEROLOGY | Facility: MEDICAL CENTER | Age: 52
End: 2017-11-15
Payer: COMMERCIAL

## 2017-11-15 ENCOUNTER — GENERIC CONVERSION - ENCOUNTER (OUTPATIENT)
Dept: GASTROENTEROLOGY | Facility: MEDICAL CENTER | Age: 52
End: 2017-11-15

## 2017-11-15 ENCOUNTER — ANESTHESIA EVENT (OUTPATIENT)
Dept: GASTROENTEROLOGY | Facility: MEDICAL CENTER | Age: 52
End: 2017-11-15
Payer: COMMERCIAL

## 2017-11-15 VITALS
DIASTOLIC BLOOD PRESSURE: 80 MMHG | OXYGEN SATURATION: 96 % | HEART RATE: 79 BPM | HEIGHT: 62 IN | RESPIRATION RATE: 20 BRPM | TEMPERATURE: 99.6 F | SYSTOLIC BLOOD PRESSURE: 144 MMHG | WEIGHT: 162.13 LBS | BODY MASS INDEX: 29.83 KG/M2

## 2017-11-15 DIAGNOSIS — Z80.0 FAMILY HISTORY OF MALIGNANT NEOPLASM OF DIGESTIVE ORGAN: ICD-10-CM

## 2017-11-15 PROCEDURE — 88305 TISSUE EXAM BY PATHOLOGIST: CPT | Performed by: INTERNAL MEDICINE

## 2017-11-15 RX ORDER — PROPOFOL 10 MG/ML
INJECTION, EMULSION INTRAVENOUS AS NEEDED
Status: DISCONTINUED | OUTPATIENT
Start: 2017-11-15 | End: 2017-11-15 | Stop reason: SURG

## 2017-11-15 RX ORDER — ONDANSETRON 2 MG/ML
4 INJECTION INTRAMUSCULAR; INTRAVENOUS ONCE AS NEEDED
Status: DISCONTINUED | OUTPATIENT
Start: 2017-11-15 | End: 2017-11-15 | Stop reason: HOSPADM

## 2017-11-15 RX ORDER — SODIUM CHLORIDE 9 MG/ML
125 INJECTION, SOLUTION INTRAVENOUS CONTINUOUS
Status: DISCONTINUED | OUTPATIENT
Start: 2017-11-15 | End: 2017-11-15 | Stop reason: HOSPADM

## 2017-11-15 RX ADMIN — PROPOFOL 20 MG: 10 INJECTION, EMULSION INTRAVENOUS at 13:12

## 2017-11-15 RX ADMIN — PROPOFOL 30 MG: 10 INJECTION, EMULSION INTRAVENOUS at 13:09

## 2017-11-15 RX ADMIN — PROPOFOL 20 MG: 10 INJECTION, EMULSION INTRAVENOUS at 13:15

## 2017-11-15 RX ADMIN — PROPOFOL 100 MG: 10 INJECTION, EMULSION INTRAVENOUS at 13:03

## 2017-11-15 RX ADMIN — SODIUM CHLORIDE: 0.9 INJECTION, SOLUTION INTRAVENOUS at 12:57

## 2017-11-15 RX ADMIN — PROPOFOL 30 MG: 10 INJECTION, EMULSION INTRAVENOUS at 13:06

## 2017-11-15 NOTE — ANESTHESIA PREPROCEDURE EVALUATION
Review of Systems/Medical History  Patient summary reviewed  Chart reviewed      Cardiovascular  Hypertension controlled,    Pulmonary  Negative pulmonary ROS ,        GI/Hepatic    GERD poorly controlled,        Negative  ROS        Endo/Other  Negative endo/other ROS      GYN       Hematology  Negative hematology ROS      Musculoskeletal  Negative musculoskeletal ROS        Neurology    Headaches,   Comment: Vertigo   Psychology   Anxiety, Depression , bipolar disorder,            Physical Exam    Airway    Mallampati score: II  TM Distance: >3 FB  Neck ROM: full     Dental       Cardiovascular  Rhythm: regular, Rate: normal, Cardiovascular exam normal    Pulmonary  Pulmonary exam normal Breath sounds clear to auscultation,     Other Findings        Anesthesia Plan  ASA Score- 2       Anesthesia Type- IV sedation with anesthesia with ASA Monitors  Additional Monitors:   Airway Plan:           Induction- intravenous  Informed Consent- Anesthetic plan and risks discussed with patient

## 2017-11-15 NOTE — OP NOTE
**** GI/ENDOSCOPY REPORT ****     PATIENT NAME: Martinez Murry ------ VISIT ID:  Patient ID:   [id number] YOB: 1965     INTRODUCTION: Colonoscopy - A 50 female patient presents for an outpatient   Colonoscopy at [address] [place] [address]  PREVIOUS COLONOSCOPY: None     INDICATIONS: Screening for family history of colorectal cancer  CONSENT:  The benefits, risks, and alternatives to the procedure were   discussed and informed consent was obtained from the patient  PREPARATION: EKG, pulse, pulse oximetry and blood pressure were monitored   throughout the procedure  The patient was identified by myself both   verbally and by visual inspection of ID band  Airway Assessment   Classification: Airway class 2 - Visualization of the soft palate, fauces   and uvula  ASA Classification: Class 2 - Patient has mild to moderate   systemic disturbance that may or may not be related to the disorder   requiring surgery  MEDICATIONS: Anesthesia-check records     PROCEDURE:  The endoscope was passed without difficulty through the anus   under direct visualization and advanced to the cecum, confirmed by   appendiceal orifice and ileocecal valve  The scope was withdrawn and the   mucosa was carefully examined  The quality of the preparation was good  Retroflexion was performed  Cecal Intubation Time: 1 minute(s) Scope   Withdrawal Time: 11 minutes(s)     RECTAL EXAM: Normal rectal exam      FINDINGS:  A single sessile polyp, measuring 8 mm in size, was found in   the descending colon  The polyp was removed by cold snare polypectomy  Two sessile polyps, measuring less than 5 mm in size, were found in the   sigmoid colon  The polyps were removed by cold biopsy polypectomy  COMPLICATIONS: There were no complications  IMPRESSIONS: A single sessile polyp found in the descending colon; removed   by cold snare polypectomy   Two sessile polyps found in the sigmoid colon; removed by cold biopsy polypectomy  RECOMMENDATIONS: Follow-up on the results of the biopsy specimens  Colonoscopy recommended in 3 years if all of the polyps are adenoma  Otherwise, repeat in 5 years  ESTIMATED BLOOD LOSS:     PATHOLOGY SPECIMENS: Removed by cold snare polypectomy  Removed by cold   biopsy polypectomy  PROCEDURE CODES:     ICD-9 Codes: V16 0 Family history of malignant neoplasm of   gastrointestinal tract 211 3 Benign neoplasm of colon 211 3 Benign   neoplasm of colon     ICD-10 Codes: Z80 0 Family history of malignant neoplasm of digestive   organs K63 5 Polyp of colon K63 5 Polyp of colon     PERFORMED BY: Dr Candance Pancoast, M D  on 11/15/2017  Version 1, electronically signed by COLEEN Vu  on 11/15/2017   at 13:53

## 2017-11-24 ENCOUNTER — GENERIC CONVERSION - ENCOUNTER (OUTPATIENT)
Dept: OTHER | Facility: OTHER | Age: 52
End: 2017-11-24

## 2017-12-14 ENCOUNTER — ALLSCRIPTS OFFICE VISIT (OUTPATIENT)
Dept: OTHER | Facility: OTHER | Age: 52
End: 2017-12-14

## 2017-12-14 ENCOUNTER — GENERIC CONVERSION - ENCOUNTER (OUTPATIENT)
Dept: OTHER | Facility: OTHER | Age: 52
End: 2017-12-14

## 2017-12-14 DIAGNOSIS — Z12.31 ENCOUNTER FOR SCREENING MAMMOGRAM FOR MALIGNANT NEOPLASM OF BREAST: ICD-10-CM

## 2017-12-21 ENCOUNTER — LAB CONVERSION - ENCOUNTER (OUTPATIENT)
Dept: GYNECOLOGY | Facility: CLINIC | Age: 52
End: 2017-12-21

## 2017-12-21 LAB
. (HISTORICAL): NORMAL
ADEQUACY: (HISTORICAL): NORMAL
CLINICIAN PROVIDIED ICD 9 OR 10 (HISTORICAL): NORMAL
COMMENT (HISTORICAL): NORMAL
DIAGNOSIS (HISTORICAL): NORMAL
PERFORMED BY (HISTORICAL): NORMAL
REFLEX (HISTORICAL): NORMAL

## 2018-01-11 NOTE — RESULT NOTES
Verified Results  * CT ABDOMEN PELVIS W CONTRAST [id number] 04:42PM Aisha Eisenmenger Order Number: [id number]    - Patient Instructions: To schedule this appointment, please contact Central Scheduling at [phone]    09/08/17 5pm sla     two bottle of barium at hospital    drink first one before 9am day of test   drink 1/2 of second bottle one hour prior to test   bring other half with to appt    npo 3 hours prior to test but can have water     Test Name Result Flag Reference   CT ABDOMEN PELVIS W CONTRAST (Report)     CT ABDOMEN AND PELVIS WITH IV CONTRAST     INDICATION: Pelvic pain and bloating  COMPARISON: None  TECHNIQUE: CT examination of the abdomen and pelvis was performed  Reformatted images were created in axial, sagittal, and coronal planes  Radiation dose length product (DLP) for this visit: 416 mGy-cm   This examination, like all CT scans performed in the [facility], was performed utilizing techniques to minimize radiation dose exposure, including the use of iterative    reconstruction and automated exposure control  IV Contrast: 100 mL of iohexol (OMNIPAQUE)      Enteric Contrast: Enteric contrast was administered  FINDINGS:     ABDOMEN     LOWER CHEST: Right middle lobe subsegmental atelectasis  No pleural effusions  LIVER/BILIARY TREE: Liver is diffusely decreased in density consistent with fatty change  No CT evidence of suspicious hepatic mass  Normal hepatic contours  No biliary dilatation  GALLBLADDER: No calcified gallstones  No pericholecystic inflammatory change  SPLEEN: Unremarkable  PANCREAS: Unremarkable  ADRENAL GLANDS: Unremarkable  KIDNEYS/URETERS: A 5 mm calculus in the distal left ureter proximal to the UVJ evokes mild hydronephrosis and pelviectasis  No perinephric collection  A 2 mm lower pole calculus is also noted  The right kidney is unremarkable  STOMACH AND BOWEL: Unremarkable       APPENDIX: No findings to suggest appendicitis  ABDOMINOPELVIC CAVITY: No ascites or free intraperitoneal air  No lymphadenopathy  VESSELS: Unremarkable for patient's age  PELVIS     REPRODUCTIVE ORGANS: Unremarkable for patient's age  URINARY BLADDER: Unremarkable  ABDOMINAL WALL/INGUINAL REGIONS: Unremarkable  OSSEOUS STRUCTURES: Bilateral L5 spondylolysis evokes a grade 1 spondylolisthesis at L5-S1  IMPRESSION:       1  5 mm distal left ureter calculus evoking mild hydronephrosis and pelviectasis  2  Hepatic steatosis         ##imslh##imslh       Workstation performed: [id number]     Signed by:   Suzie Echols MD   9/11/17

## 2018-01-12 VITALS
BODY MASS INDEX: 29.15 KG/M2 | HEART RATE: 98 BPM | DIASTOLIC BLOOD PRESSURE: 74 MMHG | WEIGHT: 158.38 LBS | SYSTOLIC BLOOD PRESSURE: 138 MMHG | HEIGHT: 62 IN | RESPIRATION RATE: 18 BRPM | TEMPERATURE: 99.9 F

## 2018-01-12 VITALS
BODY MASS INDEX: 29.63 KG/M2 | DIASTOLIC BLOOD PRESSURE: 100 MMHG | WEIGHT: 161 LBS | SYSTOLIC BLOOD PRESSURE: 130 MMHG | HEIGHT: 62 IN | HEART RATE: 80 BPM

## 2018-01-12 NOTE — MISCELLANEOUS
Message  Pt called for her mammogram results had #-D was normal she has a strong history of breast CA in her family sister recently and now a cousin did not specify maternal or paternal Advised her to have consult with Dr Creig Halsted for consult and closer monitoring      Active Problems    1  Anemia (285 9) (D64 9)   2  Atypical chest pain (786 59) (R07 89)   3  Biceps tendinitis of left shoulder (726 12) (M75 22)   4  Bipolar I disorder, single manic episode (296 00) (F30 9)   5  Chronic migraine without aura (346 70) (G43 709)   6  Current every day smoker (305 1) (F17 200)   7  Encounter for routine gynecological examination with Papanicolaou smear of cervix   (V72 31,V76 2) (Z01 419)   8  Encounter for screening mammogram for malignant neoplasm of breast (V76 12)   (Z12 31)   9  Familial cancer of breast (174 9) (C50 919)   10  Flu vaccine need (V04 81) (Z23)   11  Flu vaccine need (V04 81) (Z23)   12  Humeral fracture (812 20) (S42 309A)   13  Hypertension (401 9) (I10)   14  Migraine headache (346 90) (G43 909)   15  Overactive bladder (596 51) (N32 81)   16  Polyuria (788 42) (R35 8)   17  Post-menopausal bleeding (627 1) (N95 0)   18  Stress incontinence, female (625 6) (N39 3)   19  Urinary incontinence (788 30) (R32)    Current Meds   1  ClonazePAM 1 MG Oral Tablet; 1 QHS , 1/2 QD prn; Therapy: (Recorded:30Apr2014) to Recorded   2  Darifenacin Hydrobromide ER 7 5 MG Oral Tablet Extended Release 24 Hour (Enablex);   TAKE 1 TABLET DAILY; Therapy: 51YQN7051 to (Evaluate:08Jun2017)  Requested for: 24UKW7087; Last   Rx:10Nov2016 Ordered   3  Hydrocodone-Acetaminophen 5-325 MG Oral Tablet; Therapy: 75CLB4379 to (Evaluate:21Jun2015) Recorded   4  LamoTRIgine 200 MG Oral Tablet; TAKE 1 TABLET DAILY; Therapy: 74AWR3746 to Recorded   5  Latuda 40 MG Oral Tablet; take 1 tablet by mouth every day; Therapy: 72EKW9272 to (Evaluate:31Mar2016) Recorded   6   Meloxicam 15 MG Oral Tablet; TAKE 1 TABLET Daily PRN pain; Therapy: 00GPJ9688 to (Evaluate:47Soz9493)  Requested for: 47WFC7626; Last   Rx:32Bnd4321 Ordered   7  Oxybutynin Chloride ER 5 MG Oral Tablet Extended Release 24 Hour (Ditropan XL); Take 1 tablet daily; Therapy: 84GKS1554 to (Evaluate:98Wav4239)  Requested for: 21Jan2015; Last   Rx:21Jan2015 Ordered   8  SUMAtriptan Succinate 100 MG Oral Tablet; TAKE 1 TABLET AT ONSET OF MIGRAINE   HEADACHE  MAY REPEAT IN 2 HOURS IF NEEDED; Therapy: 30Apr2014 to (22 343954)  Requested for: 30Apr2014; Last   Rx:30Apr2014 Ordered   9  TraZODone HCl - 150 MG Oral Tablet; Therapy: (Recorded:01Mar2016) to Recorded   10  VESIcare 5 MG Oral Tablet; Take 1 tablet daily; Therapy: 70POU7073 to (Aishwarya Crain)  Requested for: 27IZN6766; Last    Rx:09Nov2016; [facility]    Ordered   11  Zofran ODT 4 MG Oral Tablet Dispersible (Ondansetron); 1 po tid prn nausea; Therapy: 30Apr2014 to (Evaluate:27Hzr1793) Recorded    Allergies    1  Codeine Sulfate TABS   2  Erythromycin TABS   3  Percocet TABS    Plan  Familial cancer of breast    · 1 - Song Wright MD, Caitlin Seals  (Surgical Oncology) Physician Referral  Consult Only: the expectation  is that the referring provider will communicate back to the patient on treatment options  Evaluation and Treatment: the expectation is that the referred to provider will  communicate back to the patient on treatment options    Status: Active - Retrospective  Authorization  Requested for: 22Gnd8805  Care Summary provided  : Yes    Signatures   Electronically signed by : Giulia Parks, ; Dec 22 2016  3:25PM EST                       (Author)

## 2018-01-13 VITALS
SYSTOLIC BLOOD PRESSURE: 138 MMHG | HEIGHT: 62 IN | BODY MASS INDEX: 29.26 KG/M2 | DIASTOLIC BLOOD PRESSURE: 88 MMHG | WEIGHT: 159 LBS | RESPIRATION RATE: 18 BRPM | HEART RATE: 84 BPM

## 2018-01-13 VITALS
BODY MASS INDEX: 29.42 KG/M2 | SYSTOLIC BLOOD PRESSURE: 148 MMHG | DIASTOLIC BLOOD PRESSURE: 102 MMHG | WEIGHT: 158.25 LBS

## 2018-01-13 VITALS
OXYGEN SATURATION: 95 % | HEIGHT: 62 IN | WEIGHT: 162 LBS | TEMPERATURE: 97.7 F | SYSTOLIC BLOOD PRESSURE: 126 MMHG | HEART RATE: 73 BPM | BODY MASS INDEX: 29.81 KG/M2 | DIASTOLIC BLOOD PRESSURE: 84 MMHG

## 2018-01-13 NOTE — PROGRESS NOTES
Assessment    1  Encounter for preventive health examination (V70 0) (Z00 00)   2  Bipolar I disorder, single manic episode (296 00) (F30 9)   3  Current every day smoker (305 1) (F17 200)   4  Familial cancer of breast (174 9) (C50 919)   5  Hypertension (401 9) (I10)   6  Cellulitis diffuse, face (682 0) (L03 211)   7  Abdominal pain of multiple sites (789 09) (R10 9)   8  Anemia (285 9) (D64 9)   9  Encounter for screening colonoscopy (V76 51) (Z12 11)    Plan   Anemia    · (1) CBC/PLT/DIFF; Status:Active; Requested for:59Zgs6076;   Bipolar I disorder, single manic episode    · (1) TSH WITH FT4 REFLEX; Status:Active; Requested for:51Yok1060;   Encounter for screening colonoscopy    · 1 - Alvis Closs MD, Rere Blum (Gastroenterology) Co-Management  *  Status: Active  Requested  for: 09YIC7745  Care Summary provided  : Yes  Flu vaccine need    · Fluzone Quadrivalent Intramuscular Suspension  Hypertension    · (1) COMPREHENSIVE METABOLIC PANEL; Status:Active; Requested for:91Pnq6392;    · (1) LIPID PANEL, FASTING; Status:Active; Requested for:31Eor0675;   Vaccine for tetanus toxoid    · Td    * CT ABDOMEN PELVIS W CONTRAST; Status:Need Information - Financial Authorization; Requested for:94Crm3963;   Perform:[facility]; Due:30Aug2018; Ordered; For:Abdominal pain of multiple sites; Ordered By:Judy Correa; Discussion/Summary    Patient presents today for an annual checkup  Her bipolar disorders relatively well controlled and she follows routinely with psychiatry  She has a history of hypertension which is controlled currently  Check fasting labs including a CMP and lipid panel  She is having some chronic abdominal bloating and has significant pain to palpation in bilateral lower quadrants and ammonia a CT abdomen and pelvis with oral and IV contrast   She is up-to-date on mammogram  There is a family history of breast cancer and she should have routine breast cancer surveillance    She will be due for a colonoscopy as of September 7, so I'm going to refer her to GI  They may also be a lipid shin on her ongoing abdominal discomfort and bloating  Her facial sialitis seems to be much improved  She has a few more days of the Bactrim  Notify me if this seems to be getting worse off of the antibiotic  She has some chronic urinary incontinence and remains on oxybutynin  She will be following with Dr Jaskaran Ag for consideration for some injections  She is due for tetanus shot today and she will have a DT  She will also have her annual flu shot today  Chief Complaint  Physical      History of Present Illness  HM, Adult Female: The patient is being seen for a health maintenance evaluation  General Health:   Screening:   HPI: Patient presents today for annual checkup  She is seen by psychiatry for her chronic bipolar disorder which she states is relatively stable  She has recently had her medications titrated and her medication list was updated  She does get occasional anxiety and depressed mood  She notes a lot of home stressors  She had a recent episode of cellulitis of her face that was quite severe  Fortunately, this has improved significantly  She denies any fever or chills  She is having no further jaw pain  She complains of having some diffuse abdominal bloating over the past year so  This seems to be getting worse  She has some occasional discomfort in her lower abdomen  She denies fever or chills  She is having no nausea or vomiting  She does continue to smoke  Her sister was recently diagnosed with breast cancer seems to be doing well  The patient is up-to-date on mammograms  Review of Systems    Constitutional: no fever, not feeling poorly, no recent weight gain, no chills, not feeling tired and no recent weight loss  ENT: no earache  Cardiovascular: the heart rate was not slow, no chest pain, the heart rate was not fast and no palpitations     Respiratory: no shortness of breath, no cough, no wheezing and no shortness of breath during exertion  Gastrointestinal: abdominal pain and constipation, but no nausea, no vomiting, no diarrhea and no blood in stools  Genitourinary: no dysuria  Musculoskeletal: no arthralgias  Integumentary: no rashes  Neurological: no headache and no numbness  Psychiatric: not suicidal, no anxiety, no sleep disturbances and no depression  Endocrine: no proptosis, no muscle weakness and no hot flashes  Hematologic/Lymphatic: no tendency for easy bleeding and no tendency for easy bruising  Active Problems    1  Anemia (285 9) (D64 9)   2  Bipolar I disorder, single manic episode (296 00) (F30 9)   3  Bloating (787 3) (R14 0)   4  Cellulitis diffuse, face (682 0) (L03 211)   5  Chronic migraine without aura (346 70) (G43 709)   6  Current every day smoker (305 1) (F17 200)   7  Familial cancer of breast (174 9) (C50 919)   8  Female pelvic pain (625 9) (R10 2)   9  Flu vaccine need (V04 81) (Z23)   10  Hypertension (401 9) (I10)   11  Overactive bladder (596 51) (N32 81)   12  Stress incontinence, female (625 6) (N39 3)    Past Medical History    · History of Abdominal mass, LLQ (left lower quadrant) (789 34) (R19 04)   · History of Abdominal pain, LLQ (left lower quadrant) (789 04) (R10 32)   · History of Anxiety (300 00) (F41 9)   · History of Atypical chest pain (786 59) (R07 89)   · History of Biceps tendinitis of left shoulder (726 12) (M75 22)   · History of Bipolar disorder (296 80) (F31 9)   · History of Breast pain (611 71) (N64 4)   · History of Cervicalgia (723 1) (M54 2)   · Delivery normal (650) (O80,Z37  9)   · History of Depression with anxiety (300 4) (F41 8)   · History of Esophageal reflux (530 81) (K21 9)   · History of Fatigue (780 79) (R53 83)   · History of Flu vaccine need (V04 81) (Z23)   · History of Head Injury (959 01)   · History of Hematoma of leg (924 5) (S80 10XA)   · History of acute conjunctivitis (V12 49) (Z86 69)   · History of acute sinusitis (V12 69) (Z87 09)   · History of fracture of humerus (V15 51) (Z87 81)   · History of gastroesophageal reflux (GERD) (V12 79) (Z87 19)   · History of headache (V13 89) (E33 843)   · History of heartburn (V12 79) (F35 523)   · History of migraine (V12 49) (Z86 69)   · History of polyuria (V13 00) (Z87 448)   · History of pregnancy (V13 29)   · History of sore throat (V12 60) (Z87 09)   · History of streptococcal pharyngitis (V12 09) (Z87 09)   · History of urinary incontinence (V13 09) (R96 557)   · History of Knee Pain Elicited By Motion   · History of Migraine headache (346 90) (G43 909)   · History of Previous Psychiatric Treatment Including Hospitalization   · History of Red eye (379 93) (H57 8)   · History of Skull Fracture (803 00)   · History of Urinary frequency (788 41) (R35 0)   · History of Visit for pre-operative examination (V72 84) (Z01 818)    Surgical History    · History of Ablation Of Vaginal Lesion(S)   · History of Biopsy Bone Open   · History of Breast Surgery Lumpectomy   · History of Dilation And Curettage   · History of Elbow Surgery   · History of Hysteroscopy With Endometrial Ablation   · History of Mid-Urethral Sling Operation   · History of Ovarian Cystectomy   · History of Ovarian Cystectomy Left   · History of Ovarian Cystectomy Right   · History of Shoulder Surgery Right   · History of Surgical Treatment For    · History of Treatment Of Fracture Of The Humerus   · History of Tubal Ligation    Family History  Mother    · Family history of Breast cancer   · Family history of Breast Cancer (V16 3)   · Family history of Hypertension (V17 49)  Father    · Family history of Asthma (V17 5)   · Family history of Coronary Artery Disease (V17 49)   · Family history of diabetes mellitus (V18 0) (Z83 3)   · Family history of Hypertension (V17 49)   · Family history of Type 2 Diabetes Mellitus  Sister    · Family history of Breast cancer  Family History    · Family history of Breast cancer   · Family history of Diabetes   · Family history of Heart disease   · Family history of Hypertension    Social History    · Active smoker (305 1) (Z72 0)   · Being A Social Drinker   · Caffeine use (V49 89) (F15 90)   · Current every day smoker (305 1) (F17 200)   · Lives with adult children   ·    · Moderate alcohol use   · No drug use    Current Meds   1  KlonoPIN 2 MG Oral Tablet Recorded   2  LaMICtal 200 MG Oral Tablet; TAKE 1 TABLET AT BEDTIME; Therapy: 05OTC3809 to (Evaluate:89Anh7492) Recorded   3  Metoprolol Succinate ER 25 MG Oral Tablet Extended Release 24 Hour; TAKE 1 TABLET   DAILY; Therapy: 67MSR1741 to (Evaluate:48Eqa6222)  Requested for: 23Aug2017; Last   Rx:60Rmu4042 Ordered   4  Oxybutynin Chloride ER 5 MG Oral Tablet Extended Release 24 Hour; TAKE 1 TABLET   DAILY; Therapy: 66Qvn3380 to (Evaluate:17Aug2018); Last Rx:68Lii7249 Ordered   5  TraZODone HCl - 150 MG Oral Tablet; Therapy: (Recorded:01Mar2016) to Recorded    Allergies    1  Codeine Sulfate TABS   2  Erythromycin TABS   3  Percocet TABS    Vitals   Recorded: 30Aug2017 04:37PM   Heart Rate 84   Respiration 18   Systolic 777   Diastolic 88   Height 5 ft 1 5 in   Weight 159 lb    BMI Calculated 29 56   BSA Calculated 1 72     Physical Exam    Constitutional   General appearance: No acute distress, well appearing and well nourished  Head and Face   Head and face: Normal     Palpation of the face and sinuses: No sinus tenderness  Eyes   Conjunctiva and lids: No swelling, erythema or discharge  Ears, Nose, Mouth, and Throat   Otoscopic examination: Tympanic membranes translucent with normal light reflex  Canals patent without erythema  Oropharynx: Normal with no erythema, edema, exudate or lesions  Neck   Neck: Supple, symmetric, trachea midline, no masses  Thyroid: Normal, no thyromegaly  Pulmonary   Respiratory effort: No increased work of breathing or signs of respiratory distress  Percussion of chest: Normal     Auscultation of lungs: Clear to auscultation  Cardiovascular   Auscultation of heart: Normal rate and rhythm, normal S1 and S2, no murmurs  Examination of extremities for edema and/or varicosities: Normal     Abdomen   Abdomen: Abnormal   She has bilateral lower quadrant pain to palpation  She is quite tender in the left lower quadrant  Liver and spleen: No hepatomegaly or splenomegaly  Examination for hernias: No hernia appreciated  Lymphatic   Palpation of lymph nodes in neck: No lymphadenopathy  Musculoskeletal   Gait and station: Normal     Skin   Skin and subcutaneous tissue: Normal without rashes or lesions  Palpation of skin and subcutaneous tissue: Normal turgor  Neurologic   Cranial nerves: Cranial nerves II-XII intact  Psychiatric   Judgment and insight: Normal     Orientation to person, place, and time: Normal     Recent and remote memory: Intact      Mood and affect: Normal        Signatures   Electronically signed by : COLEEN Jimenez ; Aug 30 2017 10:08PM EST                       (Author)

## 2018-01-14 VITALS
WEIGHT: 157.25 LBS | HEART RATE: 60 BPM | SYSTOLIC BLOOD PRESSURE: 134 MMHG | DIASTOLIC BLOOD PRESSURE: 76 MMHG | BODY MASS INDEX: 28.94 KG/M2 | OXYGEN SATURATION: 94 % | HEIGHT: 62 IN | TEMPERATURE: 98.6 F

## 2018-01-14 VITALS
WEIGHT: 162 LBS | HEART RATE: 87 BPM | SYSTOLIC BLOOD PRESSURE: 142 MMHG | DIASTOLIC BLOOD PRESSURE: 80 MMHG | HEIGHT: 62 IN | BODY MASS INDEX: 29.81 KG/M2

## 2018-01-14 VITALS
WEIGHT: 158.13 LBS | TEMPERATURE: 97.6 F | SYSTOLIC BLOOD PRESSURE: 130 MMHG | OXYGEN SATURATION: 91 % | DIASTOLIC BLOOD PRESSURE: 90 MMHG | HEART RATE: 100 BPM | HEIGHT: 62 IN | BODY MASS INDEX: 29.1 KG/M2

## 2018-01-14 NOTE — MISCELLANEOUS
Assessment    1  Atypical chest pain (786 59) (R07 89)   2  Bipolar I disorder, single manic episode (296 00) (F30 9)   3  Biceps tendinitis of left shoulder (726 12) (M75 22)   4  Anemia (285 9) (D64 9)    Plan  Biceps tendinitis of left shoulder    · Meloxicam 15 MG Oral Tablet; TAKE 1 TABLET Daily PRN pain   Rx By: River Dorantes; Dispense: 30 Days ; #:30 Tablet; Refill: 0; For: Biceps tendinitis of left shoulder; FRANCES = N; Verified Transmission to [facility][id number]; Last Updated By: [employer]; 3/1/2016 5:28:57 PM    Discussion/Summary  Discussion Summary:   The patient had an episode of atypical chest pain  Fortunately stress test as well as cardiac enzymes were all negative  She remains off of metoprolol and her blood pressure remains acceptable  I would like to recheck her in about 2 months to follow-up on her chest pain and blood pressure  Contact me sooner if she is having worsening symptoms  She does have significant tenderness along her left biceps muscle and tendons and I would like to try meloxicam 15 mg daily for least one week and then as needed  She is having some chest wall tenderness as well to palpation consistent with some possible costochondritis  Meloxicam should help this as well  She continues to follow closely with her psychiatrist regarding her bipolar disorder, but I'm not convinced that anxiety played a role in her chest pain  History of Present Illness  TCM Communication [facility]: The patient is being contacted for follow-up after hospitalization  Hospital records were not available  She was hospitalized at [facility]  The date of admission: 02/23/16, date of discharge: 02/24/16  Diagnosis: chest pain  She was discharged to home  Medications were not reviewed today  She scheduled a follow up appointment  Follow-up appointments with other specialists: no  The patient is currently asymptomatic  Counseling was provided to the patient     Communication performed and completed by fernando   HPI: Patient presents today for a recent hospital follow-up  She was admitted [facility] with the above dates  She presented with left arm tingling and pain as well as chest pain  She was admitted for a 24-hour observation  She did undergo a nuclear stress test 6 days ago which was normal  Her troponins, CMP and CBC were all normal  She did not have lipids done  She continues to get some intermittent pain in her left arm and across the left side of her chest  She denies any acute injury  She's had no diaphoresis or shortness of breath  She does have a history of bipolar disorder with significant depression  She has no current suicidal ideation, but notes her psychiatric illness is far from resolved  Review of Systems  Complete-Female:   Constitutional: feeling tired, but no fever, not feeling poorly, no recent weight gain, no chills and no recent weight loss  ENT: no earache  Cardiovascular: chest pain, but the heart rate was not slow, the heart rate was not fast and no palpitations  Respiratory: no shortness of breath, no cough, no wheezing and no shortness of breath during exertion  Gastrointestinal: no abdominal pain, no nausea, no vomiting, no constipation, no diarrhea and no blood in stools  Genitourinary: no dysuria  Integumentary: no rashes  Neurological: no numbness  Endocrine: no proptosis, no muscle weakness and no hot flashes  Hematologic/Lymphatic: no tendency for easy bleeding and no tendency for easy bruising  Active Problems    1  Anemia (285 9) (D64 9)   2  Bipolar I disorder, single manic episode (296 00) (F30 9)   3  Chronic migraine without aura (346 70) (G43 709)   4  Current every day smoker (305 1) (F17 200)   5  Encounter for routine gynecological examination with Papanicolaou smear of cervix   (V72 31,V76 2) (Z01 419)   6  Encounter for screening mammogram for malignant neoplasm of breast (V76 12)   (Z12 31)   7   Flu vaccine need (V04 81) (Z23)   8  Flu vaccine need (V04 81) (Z23)   9  Humeral fracture (812 20) (S42 309A)   10  Hypertension (401 9) (I10)   11  Migraine headache (346 90) (G43 909)   12  Overactive bladder (596 51) (N32 81)   13  Polyuria (788 42) (R35 8)   14  Post-menopausal bleeding (627 1) (N95 0)   15  Stress incontinence, female (625 6) (N39 3)   16  Urinary incontinence (788 30) (R32)    Past Medical History    1  History of Abdominal mass, LLQ (left lower quadrant) (789 34) (R19 04)   2  History of Abdominal pain, LLQ (left lower quadrant) (789 04) (R10 32)   3  History of Anxiety (300 00) (F41 9)   4  History of Bipolar disorder (296 80) (F31 9)   5  History of Breast pain (611 71) (N64 4)   6  History of Cervicalgia (723 1) (M54 2)   7  Delivery normal (650) (O80,Z37 9)   8  History of Depression with anxiety (300 4) (F41 8)   9  History of Esophageal reflux (530 81) (K21 9)   10  History of Fatigue (780 79) (R53 83)   11  History of Flu vaccine need (V04 81) (Z23)   12  History of Head Injury (959 01)   13  History of Hematoma of leg (924 5) (S80 10XA)   14  History of acute conjunctivitis (V12 49) (Z86 69)   15  History of acute sinusitis (V12 69) (Z87 09)   16  History of gastroesophageal reflux (GERD) (V12 79) (Z87 19)   17  History of headache (V13 89) (Z87 898)   18  History of heartburn (V12 79) (Z87 898)   19  History of migraine (V12 49) (Z86 69)   20  History of pregnancy (V13 29)   21  History of Knee Pain Elicited By Motion   22  History of Previous Psychiatric Treatment Including Hospitalization   23  History of Red eye (379 93) (H57 8)   24  History of Skull Fracture (803 00)   25  History of Urinary frequency (788 41) (R35 0)   26  History of Visit for pre-operative examination (V72 84) (C25 327)    Surgical History    1  History of Ablation Of Vaginal Lesion(S)   2  History of Biopsy Bone Open   3  History of Breast Surgery Lumpectomy   4  History of Dilation And Curettage   5   History of Elbow Surgery   6  History of Hysteroscopy With Endometrial Ablation   7  History of Ovarian Cystectomy   8  History of Ovarian Cystectomy Left   9  History of Ovarian Cystectomy Right   10  History of Surgical Treatment For    11  History of Treatment Of Fracture Of The Humerus   12  History of Tubal Ligation  Surgical History Reviewed: The surgical history was reviewed and updated today  Family History    1  Family history of Breast Cancer (V16 3)   2  Family history of Hypertension (V17 49)    3  Family history of Asthma (V17 5)   4  Family history of Coronary Artery Disease (V17 49)   5  Family history of diabetes mellitus (V18 0) (Z83 3)   6  Family history of Hypertension (V17 49)   7  Family history of Type 2 Diabetes Mellitus    8  Family history of Breast cancer   9  Family history of Diabetes   10  Family history of Heart disease   11  Family history of Hypertension  Family History Reviewed: The family history was reviewed and updated today  Social History    · Active smoker (305 1) (Z72 0)   · Being A Social Drinker   · Caffeine use (V49 89) (F15 90)   · Current every day smoker (305 1) (F17 200)   · Lives with adult children   ·    · Moderate alcohol use   · No drug use  Social History Reviewed: The social history was reviewed and updated today  Current Meds   1  Abilify 2 MG Oral Tablet; Therapy: 05WTE5761 to Recorded   2  ClonazePAM 1 MG Oral Tablet; 1 QHS , 1/2 QD prn; Therapy: (Recorded:18Eco4767) to Recorded   3  Hydrocodone-Acetaminophen 5-325 MG Oral Tablet; Therapy: 39SPJ9942 to (Evaluate:2015) Recorded   4  LamoTRIgine 200 MG Oral Tablet; TAKE 1 TABLET DAILY; Therapy: 02WUK0507 to Recorded   5  Latuda 40 MG Oral Tablet; take 1 tablet by mouth every day; Therapy: 78SFA9944 to (Evaluate:2016) Recorded   6  Oxybutynin Chloride ER 5 MG Oral Tablet Extended Release 24 Hour; Take 1 tablet daily;    Therapy: 85XJH7929 to (Evaluate:41Oez1734)  Requested for: 21Jan2015; Last   Rx:21Jan2015 Ordered   7  SUMAtriptan Succinate 100 MG Oral Tablet; TAKE 1 TABLET AT ONSET OF MIGRAINE   HEADACHE  MAY REPEAT IN 2 HOURS IF NEEDED; Therapy: 30Apr2014 to (03 17 74 30 53)  Requested for: 30Apr2014; Last   Rx:30Apr2014 Ordered   8  TraZODone HCl - 150 MG Oral Tablet; Therapy: (Recorded:01Mar2016) to Recorded   9  Zofran ODT 4 MG Oral Tablet Dispersible; 1 po tid prn nausea; Therapy: 30Apr2014 to (Evaluate:30May2014) Recorded  Medication List Reviewed: The medication list was reviewed and updated today  Allergies    1  Codeine Sulfate TABS   2  Erythromycin TABS   3  Percocet TABS    Vitals  Signs [Data Includes: Current Encounter]   Recorded: [id number] 04:44PM   Heart Rate: 96  Respiration: 16  Systolic: 481  Diastolic: 88  Height: 5 ft 1 in  Weight: 164 lb   BMI Calculated: 30 99  BSA Calculated: 1 73    Physical Exam    Constitutional   General appearance: No acute distress, well appearing and well nourished  Eyes   Conjunctiva and lids: No swelling, erythema or discharge  Pupils and irises: Equal, round and reactive to light  Pulmonary   Respiratory effort: No increased work of breathing or signs of respiratory distress  Auscultation of lungs: Clear to auscultation  Cardiovascular   Auscultation of heart: Normal rate and rhythm, normal S1 and S2, without murmurs  Abdomen   Abdomen: Non-tender, no masses  Liver and spleen: No hepatomegaly or splenomegaly  Lymphatic   Palpation of lymph nodes in neck: No lymphadenopathy  Musculoskeletal   Inspection/palpation of joints, bones, and muscles: Normal     Neurologic   Cranial nerves: Cranial nerves 2-12 intact  Psychiatric   Orientation to person, place, and time: Normal     Mood and affect: Abnormal   Affect is flat          Signatures   Electronically signed by : COLEEN Aguirre ; Mar  1 2016 10:16PM EST                       (Author)

## 2018-01-14 NOTE — RESULT NOTES
Verified Results  * CT ABDOMEN PELVIS W CONTRAST [id number] 04:42PM Munir Oscar Order Number: [id number]    - Patient Instructions: To schedule this appointment, please contact Central Scheduling at [phone]    09/08/17 5pm sla     two bottle of barium at hospital    drink first one before 9am day of test   drink 1/2 of second bottle one hour prior to test   bring other half with to appt    npo 3 hours prior to test but can have water     Test Name Result Flag Reference   CT ABDOMEN PELVIS W CONTRAST (Report)     CT ABDOMEN AND PELVIS WITH IV CONTRAST     INDICATION: Pelvic pain and bloating  COMPARISON: None  TECHNIQUE: CT examination of the abdomen and pelvis was performed  Reformatted images were created in axial, sagittal, and coronal planes  Radiation dose length product (DLP) for this visit: 416 mGy-cm   This examination, like all CT scans performed in the [facility], was performed utilizing techniques to minimize radiation dose exposure, including the use of iterative    reconstruction and automated exposure control  IV Contrast: 100 mL of iohexol (OMNIPAQUE)      Enteric Contrast: Enteric contrast was administered  FINDINGS:     ABDOMEN     LOWER CHEST: Right middle lobe subsegmental atelectasis  No pleural effusions  LIVER/BILIARY TREE: Liver is diffusely decreased in density consistent with fatty change  No CT evidence of suspicious hepatic mass  Normal hepatic contours  No biliary dilatation  GALLBLADDER: No calcified gallstones  No pericholecystic inflammatory change  SPLEEN: Unremarkable  PANCREAS: Unremarkable  ADRENAL GLANDS: Unremarkable  KIDNEYS/URETERS: A 5 mm calculus in the distal left ureter proximal to the UVJ evokes mild hydronephrosis and pelviectasis  No perinephric collection  A 2 mm lower pole calculus is also noted  The right kidney is unremarkable  STOMACH AND BOWEL: Unremarkable       APPENDIX: No findings to suggest appendicitis  ABDOMINOPELVIC CAVITY: No ascites or free intraperitoneal air  No lymphadenopathy  VESSELS: Unremarkable for patient's age  PELVIS     REPRODUCTIVE ORGANS: Unremarkable for patient's age  URINARY BLADDER: Unremarkable  ABDOMINAL WALL/INGUINAL REGIONS: Unremarkable  OSSEOUS STRUCTURES: Bilateral L5 spondylolysis evokes a grade 1 spondylolisthesis at L5-S1  IMPRESSION:       1  5 mm distal left ureter calculus evoking mild hydronephrosis and pelviectasis  2  Hepatic steatosis         ##imslh##imslh       Workstation performed: [id number]     Signed by:   Thad Chapa MD   9/11/17

## 2018-01-14 NOTE — RESULT NOTES
Discussion/Summary   Only the descending colon polyp was an adenoma  Repeat colonoscopy in 5 years  Verified Results  (1) TISSUE EXAM [id number] 01:09PM Colleen Britton     Test Name Result Flag Reference   LAB AP CASE REPORT (Report)     Surgical Pathology Report             Case: [id number]                   Authorizing Provider: Tri Jett MD      Collected:      11/15/2017 1309        Ordering Location:   [facility]    Received:      11/16/2017 [address] [facility]                            Pathologist:      Mitchel Hassan MD                              Specimens:  A) - Polyp, Colorectal, descending colon polyp (cold snare)                      B) - Polyp, Colorectal, sigmoid polyp x 2 (cold forceps)   LAB AP FINAL DIAGNOSIS (Report)     A  Colon, descending, polypectomy:        - Tubular adenoma  - No high-grade dysplasia or malignancy is identified  B  Colon, sigmoid, polypectomy (X 2):        - Hyperplastic polyps (X 2)  - No dysplasia or malignancy is identified  Interpretation performed at , [address]   Electronically signed by Mitchel Hassan MD on 11/20/2017 at 10:39 AM   LAB AP SURGICAL ADDITIONAL INFORMATION (Report)     All controls performed with the immunohistochemical stains reported above   reacted appropriately  These tests were developed and their performance   characteristics determined by [facility] or   [address] [place] [address]  They may not be cleared or approved by the [place]  Food and Drug Administration  The FDA has determined that such clearance   or approval is not necessary  These tests are used for clinical purposes  They should not be regarded as investigational or for research  This   laboratory has been approved by [place] [address], designated as a high-complexity   laboratory and is qualified to perform these tests  LAB AP GROSS DESCRIPTION (Report)     A  The specimen is received in formalin, labeled with the patient's name   and hospital number, and is designated descending colon polyp, is a   single irregularly shaped fragment of tan soft tissue measuring 0 4 cm in   greatest dimension  Entirely submitted  One cassette  B  The specimen is received in formalin, labeled with the patient's name   and hospital number, and is designated Sigmoid polyp ??2, are two   irregularly shaped fragments of tan soft tissue each measuring 0 3 cm in   greatest dimension  Entirely submitted  One cassette  Note: The estimated total formalin fixation time based upon information   provided by the submitting clinician and the standard processing schedule   is less than 72 hours      Jocelyn Carlisle

## 2018-01-15 VITALS
TEMPERATURE: 98.4 F | HEART RATE: 92 BPM | BODY MASS INDEX: 29.26 KG/M2 | HEIGHT: 62 IN | SYSTOLIC BLOOD PRESSURE: 138 MMHG | DIASTOLIC BLOOD PRESSURE: 96 MMHG | WEIGHT: 159 LBS | RESPIRATION RATE: 18 BRPM

## 2018-01-15 NOTE — RESULT NOTES
Verified Results  (1) CBC/PLT/DIFF [id number] 09:15AM Judy Ortiz     Test Name Result Flag Reference   WBC COUNT 4 86 Thousand/uL  4 31-10 16   RBC COUNT 4 95 Million/uL  3 81-5 12   HEMOGLOBIN 15 0 g/dL  11 5-15 4   HEMATOCRIT 44 1 %  34 8-46  1   MCV 89 fL  82-98   MCH 30 3 pg  26 8-34 3   MCHC 34 0 g/dL  31 4-37 4   RDW 13 2 %  11 6-15 1   MPV 10 7 fL  8 9-12 7   PLATELET COUNT 126 Thousands/uL  149-390   nRBC AUTOMATED 0 /100 WBCs     NEUTROPHILS RELATIVE PERCENT 44 %  43-75   LYMPHOCYTES RELATIVE PERCENT 47 % H 14-44   MONOCYTES RELATIVE PERCENT 6 %  4-12   EOSINOPHILS RELATIVE PERCENT 2 %  0-6   BASOPHILS RELATIVE PERCENT 1 %  0-1   NEUTROPHILS ABSOLUTE COUNT 2 13 Thousands/? ??L  1 85-7 62   LYMPHOCYTES ABSOLUTE COUNT 2 30 Thousands/? ??L  0 60-4 47   MONOCYTES ABSOLUTE COUNT 0 31 Thousand/? ??L  0 17-1 22   EOSINOPHILS ABSOLUTE COUNT 0 08 Thousand/? ??L  0 00-0 61   BASOPHILS ABSOLUTE COUNT 0 03 Thousands/? ??L  0 00-0 10   This is a patient instruction: This test is non-fasting  Please drink two glasses of water morning of bloodwork  (1) COMPREHENSIVE METABOLIC PANEL [id number] 55:10VP Judy Ortiz     Test Name Result Flag Reference   SODIUM 139 mmol/L  136-145   POTASSIUM 4 3 mmol/L  3 5-5 3   CHLORIDE 105 mmol/L  100-108   CARBON DIOXIDE 28 mmol/L  21-32   ANION GAP (CALC) 6 mmol/L  4-13   BLOOD UREA NITROGEN 13 mg/dL  5-25   CREATININE 0 92 mg/dL  0 60-1 30   Standardized to IDMS reference method   CALCIUM 9 2 mg/dL  8 3-10 1   BILI, TOTAL 0 29 mg/dL  0 20-1 00   ALK PHOSPHATAS 75 U/L     ALT (SGPT) 25 U/L  12-78   Specimen collection should occur prior to Sulfasalazine and/or Sulfapyridine administration due to the potential for falsely depressed results  AST(SGOT) 19 U/L  5-45   Specimen collection should occur prior to Sulfasalazine administration due to the potential for falsely depressed results     ALBUMIN 3 7 g/dL  3 5-5 0   TOTAL PROTEIN 8 0 g/dL  6 4-8 2   eGFR 73 ml/min/1 73sq [facility] Disease Education Program recommendations are as follows:  GFR calculation is accurate only with a steady state creatinine  Chronic Kidney disease less than 60 ml/min/1 73 sq  meters  Kidney failure less than 15 ml/min/1 73 sq  meters  GLUCOSE FASTING 109 mg/dL H 65-99   Specimen collection should occur prior to Sulfasalazine administration due to the potential for falsely depressed results  Specimen collection should occur prior to Sulfapyridine administration due to the potential for falsely elevated results  (1) LIPID PANEL, FASTING [id number] 09:15AM Beba Coon     Test Name Result Flag Reference   CHOLESTEROL 228 mg/dL H    HDL,DIRECT 42 mg/dL  40-60   Specimen collection should occur prior to Metamizole administration due to the potential for falsley depressed results  LDL CHOLESTEROL CALCULATED 137 mg/dL H 0-100   This is a patient instruction: This is a fasting test  Water, black tea or black coffee only after 9:00pm the night before the test  Drink 2 glasses of water the morning of the test         Triglyceride:        Normal ??? ??? ??? ??? ??? ??? ??? <150 mg/dl   ??? ??? ???Borderline High ??? ??? 150-199 mg/dl   ??? ??? ? ?? High ??? ??? ??? ??? ??? ??? ??? 200-499 mg/dl   ??? ??? ? ??Very High ??? ??? ??? ??? ??? >499 mg/dl      Cholesterol:       Desirable ??? ??? ??? ??? <200 mg/dl   ??? ??? Borderline High ??? 200-239 mg/dl   ??? ??? High ??? ??? ??? ??? ??? ??? >239 mg/dl      HDL Cholesterol:       High ??? ???>59 mg/dL   ??? ??? Low ??? ??? <41 mg/dL      This screening LDL is a calculated result  It does not have the accuracy of the Direct Measured LDL in the monitoring of patients with hyperlipidemia and/or statin therapy  Direct Measure LDL (IBW691) must be ordered separately in these patients     TRIGLYCERIDES 246 mg/dL H <=150   Specimen collection should occur prior to N-Acetylcysteine or Metamizole administration due to the potential for falsely depressed results

## 2018-01-16 ENCOUNTER — HOSPITAL ENCOUNTER (OUTPATIENT)
Dept: MAMMOGRAPHY | Facility: MEDICAL CENTER | Age: 53
Discharge: HOME | End: 2018-01-16
Payer: COMMERCIAL

## 2018-01-16 DIAGNOSIS — Z12.31 ENCOUNTER FOR SCREENING MAMMOGRAM FOR MALIGNANT NEOPLASM OF BREAST: ICD-10-CM

## 2018-01-16 PROCEDURE — 77063 BREAST TOMOSYNTHESIS BI: CPT

## 2018-01-16 PROCEDURE — 77067 SCR MAMMO BI INCL CAD: CPT

## 2018-01-16 NOTE — RESULT NOTES
Verified Results  (1) TSH WITH FT4 REFLEX [id number] 09:15AM Vicky Ortega Order Number: [id number]     Test Name Result Flag Reference   TSH 1 170 uIU/mL  0 358-3 740   Patients undergoing fluorescein dye angiography may retain small amounts of fluorescein in the body for 48-72 hours post procedure  Samples containing fluorescein can produce falsely depressed TSH values  If the patient had this procedure,a specimen should be resubmitted post fluorescein clearance            The recommended reference ranges for TSH during pregnancy are as follows:  First trimester 0 1 to 2 5 uIU/mL  Second trimester  0 2 to 3 0 uIU/mL  Third trimester 0 3 to 3 0 uIU/m

## 2018-01-17 ENCOUNTER — GENERIC CONVERSION - ENCOUNTER (OUTPATIENT)
Dept: OTHER | Facility: OTHER | Age: 53
End: 2018-01-17

## 2018-01-17 DIAGNOSIS — R92.8 OTHER ABNORMAL AND INCONCLUSIVE FINDINGS ON DIAGNOSTIC IMAGING OF BREAST: ICD-10-CM

## 2018-01-18 ENCOUNTER — HOSPITAL ENCOUNTER (OUTPATIENT)
Dept: ULTRASOUND IMAGING | Facility: CLINIC | Age: 53
Discharge: HOME | End: 2018-01-18
Payer: COMMERCIAL

## 2018-01-18 DIAGNOSIS — R92.8 OTHER ABNORMAL AND INCONCLUSIVE FINDINGS ON DIAGNOSTIC IMAGING OF BREAST: ICD-10-CM

## 2018-01-18 PROCEDURE — 76642 ULTRASOUND BREAST LIMITED: CPT

## 2018-01-22 VITALS
BODY MASS INDEX: 29.33 KG/M2 | WEIGHT: 159.38 LBS | SYSTOLIC BLOOD PRESSURE: 138 MMHG | RESPIRATION RATE: 18 BRPM | DIASTOLIC BLOOD PRESSURE: 84 MMHG | HEIGHT: 62 IN | TEMPERATURE: 99.2 F | HEART RATE: 90 BPM

## 2018-01-22 VITALS
TEMPERATURE: 97.3 F | BODY MASS INDEX: 29.33 KG/M2 | DIASTOLIC BLOOD PRESSURE: 110 MMHG | RESPIRATION RATE: 16 BRPM | SYSTOLIC BLOOD PRESSURE: 160 MMHG | HEART RATE: 85 BPM | WEIGHT: 159.38 LBS | OXYGEN SATURATION: 97 % | HEIGHT: 62 IN

## 2018-01-22 VITALS — SYSTOLIC BLOOD PRESSURE: 138 MMHG | DIASTOLIC BLOOD PRESSURE: 86 MMHG

## 2018-01-23 VITALS
SYSTOLIC BLOOD PRESSURE: 144 MMHG | WEIGHT: 159.25 LBS | DIASTOLIC BLOOD PRESSURE: 84 MMHG | BODY MASS INDEX: 30.06 KG/M2 | HEIGHT: 61 IN

## 2018-04-17 ENCOUNTER — OFFICE VISIT (OUTPATIENT)
Dept: FAMILY MEDICINE CLINIC | Facility: CLINIC | Age: 53
End: 2018-04-17
Payer: COMMERCIAL

## 2018-04-17 VITALS
TEMPERATURE: 98.1 F | OXYGEN SATURATION: 91 % | WEIGHT: 162 LBS | BODY MASS INDEX: 29.81 KG/M2 | DIASTOLIC BLOOD PRESSURE: 98 MMHG | HEART RATE: 80 BPM | SYSTOLIC BLOOD PRESSURE: 148 MMHG | HEIGHT: 62 IN

## 2018-04-17 DIAGNOSIS — I10 ESSENTIAL HYPERTENSION: Primary | ICD-10-CM

## 2018-04-17 DIAGNOSIS — R42 VERTIGO: ICD-10-CM

## 2018-04-17 PROBLEM — M62.830 BACK MUSCLE SPASM: Status: ACTIVE | Noted: 2017-10-31

## 2018-04-17 PROBLEM — N84.1 CERVICAL POLYP: Status: ACTIVE | Noted: 2017-12-14

## 2018-04-17 PROBLEM — N20.0 NEPHROLITHIASIS: Status: ACTIVE | Noted: 2017-09-19

## 2018-04-17 PROBLEM — R19.8 ALTERNATING CONSTIPATION AND DIARRHEA: Status: ACTIVE | Noted: 2017-11-07

## 2018-04-17 PROBLEM — R92.8 MAMMOGRAM ABNORMAL: Status: ACTIVE | Noted: 2018-01-17

## 2018-04-17 PROBLEM — R10.2 FEMALE PELVIC PAIN: Status: ACTIVE | Noted: 2017-07-07

## 2018-04-17 PROBLEM — R14.0 BLOATING: Status: ACTIVE | Noted: 2017-07-07

## 2018-04-17 PROBLEM — R10.9 ABDOMINAL PAIN OF MULTIPLE SITES: Status: ACTIVE | Noted: 2017-08-30

## 2018-04-17 PROCEDURE — 99213 OFFICE O/P EST LOW 20 MIN: CPT | Performed by: PHYSICIAN ASSISTANT

## 2018-04-17 RX ORDER — METOPROLOL SUCCINATE 50 MG/1
50 TABLET, EXTENDED RELEASE ORAL DAILY
Qty: 30 TABLET | Refills: 0 | Status: SHIPPED | OUTPATIENT
Start: 2018-04-17 | End: 2018-04-26 | Stop reason: SDUPTHER

## 2018-04-17 RX ORDER — DIPHENHYDRAMINE HCL 25 MG
TABLET ORAL
COMMUNITY
Start: 2017-10-05 | End: 2018-04-26

## 2018-04-17 RX ORDER — MECLIZINE HYDROCHLORIDE 25 MG/1
1 TABLET ORAL EVERY 8 HOURS PRN
COMMUNITY
Start: 2017-10-05 | End: 2019-11-18

## 2018-04-17 NOTE — PROGRESS NOTES
[facility]  FAMILY PRACTICE ACUTE OFFICE VISIT       NAME: Henrietta Wilson  AGE: 50 y o  SEX: female       : 1965        MRN: [id number]    DATE: 2018  TIME: 12:29 PM    Assessment and Plan     Problem List Items Addressed This Visit     Essential hypertension - Primary       The patient currently has uncontrolled hypertension  It appears that this is likely the cause of her recent dizziness  Her metoprolol was increased to 50 mg daily  She can double up on the 25 mg tablets that she has at home and then fill the script if needed for the 50 mg tablets that she was provided today  She will return early next week for recheck on her blood pressure  She was encouraged to bring her blood pressure monitor to that appointment  She was also directed to work on decreasing her caffeine intake throughout the day and limiting her salt as well  She should call for symptoms are worsening or failing to improve in the interim prior to her next follow-up in a few days  Relevant Medications    metoprolol succinate (TOPROL-XL) 50 mg 24 hr tablet    Vertigo       We discussed that based upon her exam, it does appear that it is still possible that she could be experiencing some vertigo in addition to her uncontrolled hypertension  She was encouraged to continue to try the meclizine if needed for relief beyond controlling her blood pressure with the increase in metoprolol  We will reassess in a few days at start of next week at her next visit  Essential hypertension    The patient currently has uncontrolled hypertension  It appears that this is likely the cause of her recent dizziness  Her metoprolol was increased to 50 mg daily  She can double up on the 25 mg tablets that she has at home and then fill the script if needed for the 50 mg tablets that she was provided today  She will return early next week for recheck on her blood pressure    She was encouraged to bring her blood pressure monitor to that appointment  She was also directed to work on decreasing her caffeine intake throughout the day and limiting her salt as well  She should call for symptoms are worsening or failing to improve in the interim prior to her next follow-up in a few days  Vertigo    We discussed that based upon her exam, it does appear that it is still possible that she could be experiencing some vertigo in addition to her uncontrolled hypertension  She was encouraged to continue to try the meclizine if needed for relief beyond controlling her blood pressure with the increase in metoprolol  We will reassess in a few days at start of next week at her next visit  Chief Complaint     Chief Complaint   Patient presents with    Dizziness    High Blood Pressure       History of Present Illness   Janis Rodrigues is a 50y o -year-old female who presents for dizziness and high BP  The patient notes that she has been feeling off for the last few days  She notes that she fell 3 times yesterday since feeling so dizzy  She notes that she keeps going to the left  She notes that at home she was getting BP reading of 200/116-123 at home last night  She notes that she has tried meclizine but not helpful  She takes that she takes the metoprolol 25 mg daily at night  She notes that she had no cigarettes yesterday  She has coffee and iced tea throughout the day  She denies much salt intake  Dizziness   Associated symptoms include nausea  Pertinent negatives include no chest pain, headaches or vomiting  Review of Systems   Review of Systems   Respiratory: Negative for shortness of breath  Cardiovascular: Negative for chest pain, palpitations and leg swelling  Gastrointestinal: Positive for nausea  Negative for vomiting  Neurological: Positive for dizziness  Negative for headaches         Active Problem List     Patient Active Problem List   Diagnosis    Abdominal pain of multiple sites    Alternating constipation and diarrhea    Anemia    Anxiety    Back muscle spasm    Bipolar 1 disorder, mixed (HCC)    Bipolar I disorder, single manic episode (HCC)    Bloating    Breast pain    Cervical polyp    Chronic migraine without aura    Essential hypertension    Familial cancer of breast ([facility] [address] )    Female pelvic pain    Hyperlipidemia    Hypertension    Mammogram abnormal    Nephrolithiasis    Overactive bladder    Stress incontinence, female    Tobacco abuse    Vertigo         Social History:  Social History     Social History    Marital status: /Civil Union     Spouse name: N/A    Number of children: N/A    Years of education: N/A     Occupational History    Not on file  Social History Main Topics    Smoking status: Current Every Day Smoker     Packs/day: 0 50    Smokeless tobacco: Never Used    Alcohol use Yes      Comment: social    Drug use: No    Sexual activity: Not on file     Other Topics Concern    Not on file     Social History Narrative    No narrative on file       Objective     Vitals:    04/17/18 1223   BP: 148/98   Pulse:    Temp:    SpO2:      Wt Readings from Last 3 Encounters:   04/17/18 73 5 kg (162 lb)   12/14/17 72 2 kg (159 lb 4 oz)   11/15/17 73 5 kg (162 lb 2 oz)       Physical Exam   Constitutional: She appears well-developed and well-nourished  HENT:   Head: Normocephalic and atraumatic  Neck: Neck supple  No thyromegaly present  Cardiovascular: Normal rate, regular rhythm, normal heart sounds and intact distal pulses  No murmur heard  No carotid bruits noted   Pulmonary/Chest: Effort normal and breath sounds normal    Musculoskeletal: She exhibits no edema  Lymphadenopathy:     She has no cervical adenopathy  Neurological: She is alert  Negative Sarahi-Hallpike bilaterally but dizzy upon sitting up   Psychiatric: She has a normal mood and affect           ALLERGIES:  Allergies   Allergen Reactions    Codeine GI Intolerance    Erythromycin GI Intolerance    Percolone [Oxycodone] GI Intolerance    Topiramate Other (See Comments)     vomitting       Current Medications     Current Outpatient Prescriptions   Medication Sig Dispense Refill    clonazePAM (KlonoPIN) 2 mg tablet Take 2 mg by mouth      diphenhydrAMINE (BENADRYL) 25 mg tablet Take by mouth      lamoTRIgine (LaMICtal) 200 MG tablet Take 200 mg by mouth daily at bedtime      meclizine (ANTIVERT) 25 mg tablet Take 1 tablet by mouth every 8 (eight) hours as needed for dizziness       metoprolol succinate (TOPROL-XL) 50 mg 24 hr tablet Take 1 tablet (50 mg total) by mouth daily 30 tablet 0    traZODone (DESYREL) 150 mg tablet Take 150 mg by mouth daily         No current facility-administered medications for this visit            Tonio Doll PA-C  4/17/2018 12:29 PM  [facility]

## 2018-04-17 NOTE — PATIENT INSTRUCTIONS
Essential hypertension    The patient currently has uncontrolled hypertension  It appears that this is likely the cause of her recent dizziness  Her metoprolol was increased to 50 mg daily  She can double up on the 25 mg tablets that she has at home and then fill the script if needed for the 50 mg tablets that she was provided today  She will return early next week for recheck on her blood pressure  She was encouraged to bring her blood pressure monitor to that appointment  She was also directed to work on decreasing her caffeine intake throughout the day and limiting her salt as well  She should call for symptoms are worsening or failing to improve in the interim prior to her next follow-up in a few days  Vertigo    We discussed that based upon her exam, it does appear that it is still possible that she could be experiencing some vertigo in addition to her uncontrolled hypertension  She was encouraged to continue to try the meclizine if needed for relief beyond controlling her blood pressure with the increase in metoprolol  We will reassess in a few days at start of next week at her next visit

## 2018-04-22 NOTE — PROGRESS NOTES
[place] [facility]  FAMILY PRACTICE OFFICE VISIT       NAME: Johnny Jamison  AGE: 50 y o  SEX: female       : 1965        MRN: [id number]    DATE: 2018  TIME: 8:10 PM    Assessment and Plan     Problem List Items Addressed This Visit     Hyperlipidemia     Recheck with labs as above  Encouraged to limit fat intake  Relevant Orders    Lipid Panel with Direct LDL reflex    Hypertension - Primary     Improved but still running high  Increase metoprolol to 100 mg daily  She will continue to monitor at home and follow-up in 2 weeks  Relevant Medications    metoprolol succinate (TOPROL-XL) 50 mg 24 hr tablet    Excessive daytime sleepiness     Check sleep study for possible JYOTI as cause of drowsiness and hypertension  Relevant Orders    Home Study    Thyromegaly     Assess with thyroid ultrasound and labs  Relevant Medications    metoprolol succinate (TOPROL-XL) 50 mg 24 hr tablet    Other Relevant Orders    US thyroid    TSH, 3rd generation with T4 reflex    Impaired fasting blood sugar     Recheck with labs as above  Encouraged to limit carbs  Relevant Orders    Hemoglobin A1c    Comprehensive metabolic panel          Hypertension  Improved but still running high  Increase metoprolol to 100 mg daily  She will continue to monitor at home and follow-up in 2 weeks  Excessive daytime sleepiness  Check sleep study for possible JYOTI as cause of drowsiness and hypertension  Hyperlipidemia  Recheck with labs as above  Encouraged to limit fat intake  Thyromegaly  Assess with thyroid ultrasound and labs  Impaired fasting blood sugar  Recheck with labs as above  Encouraged to limit carbs  Chief Complaint     Chief Complaint   Patient presents with    Hypertension       History of Present Illness   Janis Nicholson is a 50y o -year-old female who presents for BP follow-up         Patient presents today for follow-up on her blood pressure and dizziness  We reviewed that her blood pressure was uncontrolled at her last appointment and her metoprolol was increased to 50 mg daily  We reviewed at the time that this could have been causing her dizziness but she also appeared to have possibly been experiencing a separate case of vertigo  She was directed to use meclizine as needed if she continued to feel dizzy but did not need any  She was additionally encouraged to work on decreasing her caffeine intake and limiting salt as well  She states that since her last visit, she has had resolution of the dizziness (resolved the following day), but BP is still high  She notes that BP has been running 150-180/100-112 at home - verified accuracy of BP cuff today with staff  She notes that she is really tired all day  She sleeps for 6-8 hours but is tired about an hour after waking  She notes that this has been a long term problem  She used to link it to sleep meds but was okay in the past besides just a nap  Review of Systems   Review of Systems   Constitutional: Positive for fatigue  Negative for chills and fever  Respiratory: Negative for cough, chest tightness, shortness of breath and wheezing  Cardiovascular: Negative for chest pain, palpitations and leg swelling  Neurological: Negative for dizziness and headaches  Psychiatric/Behavioral: Positive for sleep disturbance (takes an hour to fall asleep most nights)         Active Problem List     Patient Active Problem List   Diagnosis    Abdominal pain of multiple sites    Alternating constipation and diarrhea    Anemia    Anxiety    Back muscle spasm    Bipolar 1 disorder, mixed (HCC)    Bipolar I disorder, single manic episode (HCC)    Bloating    Breast pain    Cervical polyp    Chronic migraine without aura    Familial cancer of breast ([facility] [address] )    Female pelvic pain    Hyperlipidemia    Hypertension    Mammogram abnormal    Nephrolithiasis    Overactive bladder    Stress incontinence, female    Tobacco abuse    Vertigo    Excessive daytime sleepiness    Thyromegaly    Impaired fasting blood sugar         Past Medical History:  Past Medical History:   Diagnosis Date    Abdominal mass, LLQ (left lower quadrant)     resolved 2015    Anemia     Anxiety     Bipolar disorder (HCC)     Cervicalgia     resolved 2015    Depression     with anxiety per allscripts    Esophageal reflux     resolved 2015    Fracture of humerus     s/p ORIF R humerus resolved 2017    GERD (gastroesophageal reflux disease)     Headache     Heartburn     last assessed 10/11/2012    Hypertension     Migraine     Polyuria     resolved 2017    Skull fracture ([address] )     s/p trauma  s/p fall at work    Subdural hemorrhage [facility])     skull fracture    Urinary frequency     resolved 2015    Urinary incontinence     resolved 2017    Vertigo        Past Surgical History:  Past Surgical History:   Procedure Laterality Date    ABDOMINAL SURGERY      abdominal mass    BLADDER SUSPENSION      BONE BIOPSY      open    BREAST LUMPECTOMY      age 34 benign per allscripts    DILATION AND CURETTAGE OF UTERUS      x2 due to bleeding per allscripts    ELBOW SURGERY Right     per allscripts    ENDOMETRIAL ABLATION      vaginal lesion(s) hydrothermal ablation     HUMERUS FRACTURE SURGERY      s/p fall with plate    HYSTEROSCOPY W/ ENDOMETRIAL ABLATION      OTHER SURGICAL HISTORY      surgical treatment for  age 14    [employer] OVARIAN CYST REMOVAL Left     OVARY SURGERY Right     cystectomy per allscripts    SD COLONOSCOPY FLX DX W/COLLJ SPEC WHEN PFRMD N/A 11/15/2017    Procedure: COLONOSCOPY;  Surgeon: Ranjith Barnett MD;  Location: [facility] GI [facility];   Service: Gastroenterology    SHOULDER SURGERY Right     TUBAL LIGATION      URETHRAL SLING      mid per allscripts       Family History:  Family History   Problem Relation Age of Onset    Breast cancer Mother     Hypertension Mother     Asthma Father     Coronary artery disease Father     Diabetes Father     Hypertension Father     Diabetes type II Father     Breast cancer Sister     Diabetes Other     Heart disease Other     Hypertension Other        Social History:  Social History     Social History    Marital status: /Civil Union     Spouse name: N/A    Number of children: N/A    Years of education: N/A     Occupational History    Not on file  Social History Main Topics    Smoking status: Current Every Day Smoker     Packs/day: 0 50    Smokeless tobacco: Never Used      Comment: active smoker per allscripts    Alcohol use Yes      Comment: social, moderate per allscripts    Drug use: No    Sexual activity: Not on file     Other Topics Concern    Not on file     Social History Narrative    Caffeine use    Lives with adult children               Objective     Vitals:    04/26/18 1007   BP: 138/90   Pulse:    Temp:    SpO2: 96%     Wt Readings from Last 3 Encounters:   04/26/18 74 9 kg (165 lb 2 oz)   04/17/18 73 5 kg (162 lb)   12/14/17 72 2 kg (159 lb 4 oz)       Physical Exam   Constitutional: She appears well-developed and well-nourished  HENT:   Head: Normocephalic and atraumatic  Neck: Neck supple  Thyromegaly (slight diffuse enlargement noted) present  Cardiovascular: Normal rate, regular rhythm, normal heart sounds and intact distal pulses  No murmur heard  No carotid bruits noted   Pulmonary/Chest: Effort normal and breath sounds normal    Musculoskeletal: She exhibits no edema  Lymphadenopathy:     She has no cervical adenopathy  Neurological: She is alert  Skin: Skin is warm and dry  Psychiatric: She has a normal mood and affect  Vitals reviewed        Pertinent Laboratory/Diagnostic Studies:  Lab Results   Component Value Date    GLUCOSE 107 10/07/2014    BUN 13 09/05/2017    CREATININE 0 92 09/05/2017    CALCIUM 9 2 09/05/2017     09/05/2017    K 4 3 09/05/2017    CO2 28 09/05/2017     09/05/2017     Lab Results   Component Value Date    ALT 25 09/05/2017    AST 19 09/05/2017    ALKPHOS 75 09/05/2017    BILITOT 0 29 09/05/2017       Lab Results   Component Value Date    WBC 4 86 09/05/2017    HGB 15 0 09/05/2017    HCT 44 1 09/05/2017    MCV 89 09/05/2017     09/05/2017     Lab Results   Component Value Date    CHOL 228 (H) 09/05/2017     Lab Results   Component Value Date    TRIG 246 (H) 09/05/2017     Lab Results   Component Value Date    HDL 42 09/05/2017     Lab Results   Component Value Date    LDLCALC 137 (H) 09/05/2017       Orders Placed This Encounter   Procedures    US thyroid    TSH, 3rd generation with T4 reflex    Hemoglobin A1c    Comprehensive metabolic panel    Lipid Panel with Direct LDL reflex    Home Study       ALLERGIES:  Allergies   Allergen Reactions    Codeine GI Intolerance    Erythromycin GI Intolerance    Percolone [Oxycodone] GI Intolerance    Topiramate Other (See Comments)     vomitting       Current Medications     Current Outpatient Prescriptions   Medication Sig Dispense Refill    clonazePAM (KlonoPIN) 2 mg tablet Take 2 mg by mouth      lamoTRIgine (LaMICtal) 200 MG tablet Take 200 mg by mouth daily at bedtime      meclizine (ANTIVERT) 25 mg tablet Take 1 tablet by mouth every 8 (eight) hours as needed for dizziness       metoprolol succinate (TOPROL-XL) 50 mg 24 hr tablet Take 2 tablets (100 mg total) by mouth daily 30 tablet 0    traZODone (DESYREL) 150 mg tablet Take 150 mg by mouth daily         No current facility-administered medications for this visit            Health Maintenance     Health Maintenance   Topic Date Due    HIV SCREENING  1965    Depression Screening PHQ-9  09/07/1979    INFLUENZA VACCINE  09/01/2017    COLONOSCOPY  11/15/2022    DTaP,Tdap,and Td Vaccines (3 - Td) 08/30/2027     Immunization History   Administered Date(s) Administered    Influenza Quadrivalent Preservative Free 3 years and older IM 10/16/2014, 11/11/2016    Influenza Quadrivalent, 6-35 Months IM 11/02/2015, 08/30/2017    Td (adult), adsorbed 08/30/2017    Tdap 01/01/2007, 10/03/2007       Lito Morfin PA-C  4/26/2018 8:10 PM  [facility]

## 2018-04-26 ENCOUNTER — OFFICE VISIT (OUTPATIENT)
Dept: FAMILY MEDICINE CLINIC | Facility: CLINIC | Age: 53
End: 2018-04-26
Payer: COMMERCIAL

## 2018-04-26 VITALS
TEMPERATURE: 98.7 F | HEIGHT: 62 IN | DIASTOLIC BLOOD PRESSURE: 90 MMHG | SYSTOLIC BLOOD PRESSURE: 138 MMHG | BODY MASS INDEX: 30.39 KG/M2 | WEIGHT: 165.13 LBS | OXYGEN SATURATION: 96 % | HEART RATE: 88 BPM

## 2018-04-26 DIAGNOSIS — G47.19 EXCESSIVE DAYTIME SLEEPINESS: ICD-10-CM

## 2018-04-26 DIAGNOSIS — R73.01 IMPAIRED FASTING BLOOD SUGAR: ICD-10-CM

## 2018-04-26 DIAGNOSIS — E01.0 THYROMEGALY: ICD-10-CM

## 2018-04-26 DIAGNOSIS — E78.5 HYPERLIPIDEMIA, UNSPECIFIED HYPERLIPIDEMIA TYPE: ICD-10-CM

## 2018-04-26 DIAGNOSIS — I10 ESSENTIAL HYPERTENSION: Primary | ICD-10-CM

## 2018-04-26 PROCEDURE — 99214 OFFICE O/P EST MOD 30 MIN: CPT | Performed by: PHYSICIAN ASSISTANT

## 2018-04-26 RX ORDER — METOPROLOL SUCCINATE 50 MG/1
100 TABLET, EXTENDED RELEASE ORAL DAILY
Qty: 30 TABLET | Refills: 0
Start: 2018-04-26 | End: 2018-05-10 | Stop reason: SDUPTHER

## 2018-04-26 NOTE — ASSESSMENT & PLAN NOTE
Improved but still running high  Increase metoprolol to 100 mg daily  She will continue to monitor at home and follow-up in 2 weeks

## 2018-04-26 NOTE — PATIENT INSTRUCTIONS
Hypertension  Improved but still running high  Increase metoprolol to 100 mg daily  She will continue to monitor at home and follow-up in 2 weeks  Excessive daytime sleepiness  Check sleep study for possible JYOTI as cause of drowsiness and hypertension  Hyperlipidemia  Recheck with labs as above  Encouraged to limit fat intake  Thyromegaly  Assess with thyroid ultrasound and labs  Impaired fasting blood sugar  Recheck with labs as above  Encouraged to limit carbs

## 2018-04-27 ENCOUNTER — HOSPITAL ENCOUNTER (OUTPATIENT)
Dept: ULTRASOUND IMAGING | Facility: HOSPITAL | Age: 53
Discharge: HOME | End: 2018-04-27
Payer: COMMERCIAL

## 2018-04-27 DIAGNOSIS — E01.0 THYROMEGALY: ICD-10-CM

## 2018-04-27 PROCEDURE — 76536 US EXAM OF HEAD AND NECK: CPT

## 2018-05-08 ENCOUNTER — APPOINTMENT (OUTPATIENT)
Dept: LAB | Facility: HOSPITAL | Age: 53
End: 2018-05-08
Payer: COMMERCIAL

## 2018-05-08 DIAGNOSIS — E78.5 HYPERLIPIDEMIA, UNSPECIFIED HYPERLIPIDEMIA TYPE: ICD-10-CM

## 2018-05-08 DIAGNOSIS — R73.01 IMPAIRED FASTING BLOOD SUGAR: ICD-10-CM

## 2018-05-08 DIAGNOSIS — E01.0 THYROMEGALY: ICD-10-CM

## 2018-05-08 LAB
ALBUMIN SERPL BCP-MCNC: 3.7 G/DL (ref 3.5–5)
ALP SERPL-CCNC: 59 U/L (ref 46–116)
ALT SERPL W P-5'-P-CCNC: 30 U/L (ref 12–78)
ANION GAP SERPL CALCULATED.3IONS-SCNC: 7 MMOL/L (ref 4–13)
AST SERPL W P-5'-P-CCNC: 25 U/L (ref 5–45)
BILIRUB SERPL-MCNC: 0.34 MG/DL (ref 0.2–1)
BUN SERPL-MCNC: 11 MG/DL (ref 5–25)
CALCIUM SERPL-MCNC: 9 MG/DL (ref 8.3–10.1)
CHLORIDE SERPL-SCNC: 103 MMOL/L (ref 100–108)
CHOLEST SERPL-MCNC: 241 MG/DL (ref 50–200)
CO2 SERPL-SCNC: 32 MMOL/L (ref 21–32)
CREAT SERPL-MCNC: 0.95 MG/DL (ref 0.6–1.3)
EST. AVERAGE GLUCOSE BLD GHB EST-MCNC: 117 MG/DL
GFR SERPL CREATININE-BSD FRML MDRD: 70 ML/MIN/1.73SQ M
GLUCOSE P FAST SERPL-MCNC: 107 MG/DL (ref 65–99)
HBA1C MFR BLD: 5.7 % (ref 4.2–6.3)
HDLC SERPL-MCNC: 42 MG/DL (ref 40–60)
LDLC SERPL CALC-MCNC: 143 MG/DL (ref 0–100)
POTASSIUM SERPL-SCNC: 3.8 MMOL/L (ref 3.5–5.3)
PROT SERPL-MCNC: 7.9 G/DL (ref 6.4–8.2)
SODIUM SERPL-SCNC: 142 MMOL/L (ref 136–145)
TRIGL SERPL-MCNC: 278 MG/DL
TSH SERPL DL<=0.05 MIU/L-ACNC: 0.94 UIU/ML (ref 0.36–3.74)

## 2018-05-08 PROCEDURE — 80061 LIPID PANEL: CPT

## 2018-05-08 PROCEDURE — 84443 ASSAY THYROID STIM HORMONE: CPT

## 2018-05-08 PROCEDURE — 80053 COMPREHEN METABOLIC PANEL: CPT

## 2018-05-08 PROCEDURE — 36415 COLL VENOUS BLD VENIPUNCTURE: CPT

## 2018-05-08 PROCEDURE — 83036 HEMOGLOBIN GLYCOSYLATED A1C: CPT

## 2018-05-09 NOTE — PROGRESS NOTES
[place] [facility]  FAMILY PRACTICE OFFICE VISIT       NAME: Monet Maher  AGE: 50 y o  SEX: female       : 1965        MRN: [id number]    DATE: 2018  TIME: 4:08 PM    Assessment and Plan     Problem List Items Addressed This Visit     Hyperlipidemia - Primary     Start Lipitor 20 mg daily and recheck in 3 months  Relevant Medications    atorvastatin (LIPITOR) 20 mg tablet    Other Relevant Orders    Lipid Panel with Direct LDL reflex    Comprehensive metabolic panel    Hypertension     Improved  She will continue with metoprolol 100 mg daily  Relevant Orders    Comprehensive metabolic panel    Tobacco abuse     Encouraged to work on quitting smoking  Patient was given a script to restart using Chantix  We will follow up on her progress when she returns in several months  She was encouraged to call with any problems or concerns in the interim  She is aware the possible side effects with the medication but notes that she never had any problems with that when she used it in the past   She will call if she experiences any side effects  Relevant Medications    varenicline (CHANTIX VÍCTOR) 0 5 MG X 11 & 1 MG X 42 tablet    varenicline (CHANTIX) 1 mg tablet          Hyperlipidemia  Start Lipitor 20 mg daily and recheck in 3 months  Tobacco abuse  Encouraged to work on quitting smoking  Patient was given a script to restart using Chantix  We will follow up on her progress when she returns in several months  She was encouraged to call with any problems or concerns in the interim  She is aware the possible side effects with the medication but notes that she never had any problems with that when she used it in the past   She will call if she experiences any side effects  Hypertension  Improved  She will continue with metoprolol 100 mg daily             Chief Complaint     Chief Complaint   Patient presents with    Hypertension    Follow-up     review labs History of Present Illness   Janis Nicholson is a 50y o -year-old female who presents for hypertension follow-up  Patient presents today for 2 week follow-up on her blood pressure  Her blood pressure was still elevated at her last visit so the metoprolol was increased to 100 mg daily  She reports that her blood pressures at home have been 145-152/100  She denies chest pain, palpitations, headaches, dizziness, or leg swelling  There was concern for possible thyroid enlargement at her last visit so a thyroid ultrasound was ordered and did come back normal   She also had an order for labs, which showed erum TSH, elevated cholesterol with LDL of 143 and elevated glucose/prediabetes with A1c of 5 7%  She notes that she does have a diet high in fat and carbs and does not think it will change much  A sleep study was also ordered due to significant fatigue/daytime sleepiness  This is scheduled for 6/9/18  Review of Systems   Review of Systems   Constitutional: Positive for fatigue  Negative for chills and fever  Respiratory: Negative for shortness of breath  Cardiovascular: Negative for chest pain, palpitations and leg swelling  Neurological: Negative for dizziness and headaches         Active Problem List     Patient Active Problem List   Diagnosis    Abdominal pain of multiple sites    Alternating constipation and diarrhea    Anemia    Anxiety    Back muscle spasm    Bipolar 1 disorder, mixed (HCC)    Bipolar I disorder, single manic episode (HCC)    Bloating    Breast pain    Cervical polyp    Chronic migraine without aura    F[facility] cancer [facility] breast ([facility] [address]    Female pelvic pain    Hyperlipidemia    Hypertension    Mammogram abnormal    Nephrolithiasis    Overactive bladder    Stress incontinence, female    Tobacco abuse    Vertigo    Excessive daytime sleepiness    Thyromegaly    Impaired fasting blood sugar         Past Medical History:  Past Medical History:   Diagnosis Date    Abdominal mass, LLQ (left lower quadrant)     resolved 2015    Anemia     Anxiety     Bipolar disorder (HCC)     Cervicalgia     resolved 2015    Depression     with anxiety per allscripts    Esophageal reflux     resolved 2015    Fracture of humerus     s/p ORIF R humerus resolved 2017    GERD (gastroesophageal reflux disease)     Headache     Heartburn     last assessed 10/11/2012    Hypertension     Migraine     Polyuria     resolved 2017    Skull fracture ([address] )     s/p trauma  s/p fall at work    Subdural hemorrhage [facility])     skull fracture    Urinary frequency     resolved 2015    Urinary incontinence     resolved 2017    Vertigo        Past Surgical History:  Past Surgical History:   Procedure Laterality Date    ABDOMINAL SURGERY      abdominal mass    BLADDER SUSPENSION      BONE BIOPSY      open    BREAST LUMPECTOMY      age 34 benign per allscripts    DILATION AND CURETTAGE OF UTERUS      x2 due to bleeding per allscripts    ELBOW SURGERY Right     per allscripts    ENDOMETRIAL ABLATION      vaginal lesion(s) hydrothermal ablation     HUMERUS FRACTURE SURGERY      s/p fall with plate    HYSTEROSCOPY W/ ENDOMETRIAL ABLATION      OTHER SURGICAL HISTORY      surgical treatment for  age 14    [employer] OVARIAN CYST REMOVAL Left     OVARY SURGERY Right     cystectomy per allscripts    VT COLONOSCOPY FLX DX W/COLLJ SPEC WHEN PFRMD N/A 11/15/2017    Procedure: COLONOSCOPY;  Surgeon: Sheela Gomez MD;  Location: [facility] GI LAB;   Service: Gastroenterology    SHOULDER SURGERY Right     TUBAL LIGATION      URETHRAL SLING      mid per allscripts       Family History:  Family History   Problem Relation Age of Onset   [employer] Breast cancer Mother     Hypertension Mother     Asthma Father     Coronary artery disease Father     Diabetes Father     Hypertension Father     Diabetes type II Father     Breast cancer Sister     Diabetes Other     Heart disease Other     Hypertension Other        Social History:  Social History     Social History    Marital status: /Civil Union     Spouse name: N/A    Number of children: N/A    Years of education: N/A     Occupational History    Not on file  Social History Main Topics    Smoking status: Current Every Day Smoker     Packs/day: 0 50    Smokeless tobacco: Never Used      Comment: active smoker per allscripts    Alcohol use Yes      Comment: social, moderate per allscripts    Drug use: No    Sexual activity: Not on file     Other Topics Concern    Not on file     Social History Narrative    Caffeine use    Lives with adult children               Objective     Vitals:    05/11/18 1330   BP: 130/88   Pulse: 88     Wt Readings from Last 3 Encounters:   05/11/18 74 kg (163 lb 4 oz)   04/26/18 74 9 kg (165 lb 2 oz)   04/17/18 73 5 kg (162 lb)       Physical Exam   Constitutional: She appears well-developed and well-nourished  HENT:   Head: Normocephalic and atraumatic  Neck: Neck supple  No thyromegaly present  Cardiovascular: Normal rate, regular rhythm, normal heart sounds and intact distal pulses  No murmur heard  No carotid bruits noted   Pulmonary/Chest: Effort normal and breath sounds normal    Musculoskeletal: She exhibits no edema  Lymphadenopathy:     She has no cervical adenopathy  Neurological: She is alert  Skin: Skin is warm and dry  Psychiatric: She has a normal mood and affect   Her behavior is normal        Pertinent Laboratory/Diagnostic Studies:  Results for orders placed or performed in visit on 05/08/18   TSH, 3rd generation with T4 reflex   Result Value Ref Range    TSH 3RD GENERATON 0 945 0 358 - 3 740 uIU/mL   Comprehensive metabolic panel   Result Value Ref Range    Sodium 142 136 - 145 mmol/L    Potassium 3 8 3 5 - 5 3 mmol/L    Chloride 103 100 - 108 mmol/L    CO2 32 21 - 32 mmol/L    Anion Gap 7 4 - 13 mmol/L BUN 11 5 - 25 mg/dL    Creatinine 0 95 0 60 - 1 30 mg/dL    Glucose, Fasting 107 (H) 65 - 99 mg/dL    Calcium 9 0 8 3 - 10 1 mg/dL    AST 25 5 - 45 U/L    ALT 30 12 - 78 U/L    Alkaline Phosphatase 59 46 - 116 U/L    Total Protein 7 9 6 4 - 8 2 g/dL    Albumin 3 7 3 5 - 5 0 g/dL    Total Bilirubin 0 34 0 20 - 1 00 mg/dL    eGFR 70 ml/min/1 73sq m   Lipid Panel with Direct LDL reflex   Result Value Ref Range    Cholesterol 241 (H) 50 - 200 mg/dL    Triglycerides 278 (H) <=150 mg/dL    HDL, Direct 42 40 - 60 mg/dL    LDL Calculated 143 (H) 0 - 100 mg/dL   HEMOGLOBIN A1C W/ EAG ESTIMATION   Result Value Ref Range    Hemoglobin A1C 5 7 4 2 - 6 3 %     mg/dl       Orders Placed This Encounter   Procedures    Hm Mammography    Lipid Panel with Direct LDL reflex    Comprehensive metabolic panel       ALLERGIES:  Allergies   Allergen Reactions    Codeine GI Intolerance    Erythromycin GI Intolerance    Percolone [Oxycodone] GI Intolerance    Topiramate Other (See Comments)     vomitting       Current Medications     Current Outpatient Prescriptions   Medication Sig Dispense Refill    clonazePAM (KlonoPIN) 2 mg tablet Take 2 mg by mouth      lamoTRIgine (LaMICtal) 200 MG tablet Take 200 mg by mouth daily at bedtime Take 1 1/2 at bedtime   meclizine (ANTIVERT) 25 mg tablet Take 1 tablet by mouth every 8 (eight) hours as needed for dizziness       metoprolol succinate (TOPROL-XL) 100 mg 24 hr tablet Take 1 tablet (100 mg total) by mouth daily 30 tablet 5    traZODone (DESYREL) 150 mg tablet Take 150 mg by mouth daily        atorvastatin (LIPITOR) 20 mg tablet Take 1 tablet (20 mg total) by mouth daily 30 tablet 3    varenicline (CHANTIX VÍCTOR) 0 5 MG X 11 & 1 MG X 42 tablet Take one 0 5mg tablet by mouth once daily for 3 days, then increase to a 0 5mg tablet twice daily for 3 days, then increase to one 1mg tablet twice daily   53 tablet 0    varenicline (CHANTIX) 1 mg tablet Take 1 tablet (1 mg total) by mouth 2 (two) times a day 60 tablet 4     No current facility-administered medications for this visit            Health Maintenance     Health Maintenance   Topic Date Due    HIV SCREENING  1965    Depression Screening PHQ-9  09/07/1979    INFLUENZA VACCINE  09/01/2018    MAMMOGRAM  01/16/2019    COLONOSCOPY  11/15/2022    DTaP,Tdap,and Td Vaccines (3 - Td) 08/30/2027     Immunization History   Administered Date(s) Administered    Influenza Quadrivalent Preservative Free 3 years and older IM 10/16/2014, 11/11/2016    Influenza Quadrivalent, 6-35 Months IM 11/02/2015, 08/30/2017    Td (adult), adsorbed 08/30/2017    Tdap 01/01/2007, 10/03/2007       Karol Mckeon PA-C  5/11/2018 4:08 PM  [facility]

## 2018-05-10 DIAGNOSIS — I10 ESSENTIAL HYPERTENSION: ICD-10-CM

## 2018-05-11 ENCOUNTER — OFFICE VISIT (OUTPATIENT)
Dept: FAMILY MEDICINE CLINIC | Facility: CLINIC | Age: 53
End: 2018-05-11
Payer: COMMERCIAL

## 2018-05-11 VITALS
SYSTOLIC BLOOD PRESSURE: 130 MMHG | HEART RATE: 88 BPM | HEIGHT: 62 IN | BODY MASS INDEX: 30.04 KG/M2 | WEIGHT: 163.25 LBS | DIASTOLIC BLOOD PRESSURE: 88 MMHG

## 2018-05-11 DIAGNOSIS — E78.5 HYPERLIPIDEMIA, UNSPECIFIED HYPERLIPIDEMIA TYPE: Primary | ICD-10-CM

## 2018-05-11 DIAGNOSIS — Z72.0 TOBACCO ABUSE: ICD-10-CM

## 2018-05-11 DIAGNOSIS — I10 ESSENTIAL HYPERTENSION: ICD-10-CM

## 2018-05-11 PROCEDURE — 99214 OFFICE O/P EST MOD 30 MIN: CPT | Performed by: PHYSICIAN ASSISTANT

## 2018-05-11 RX ORDER — ATORVASTATIN CALCIUM 20 MG/1
20 TABLET, FILM COATED ORAL DAILY
Qty: 30 TABLET | Refills: 3 | Status: SHIPPED | OUTPATIENT
Start: 2018-05-11 | End: 2018-08-14 | Stop reason: SDUPTHER

## 2018-05-11 RX ORDER — METOPROLOL SUCCINATE 100 MG/1
100 TABLET, EXTENDED RELEASE ORAL DAILY
Qty: 30 TABLET | Refills: 5 | Status: SHIPPED | OUTPATIENT
Start: 2018-05-11 | End: 2018-11-08 | Stop reason: SDUPTHER

## 2018-05-11 RX ORDER — VARENICLINE TARTRATE 25 MG
KIT ORAL
Qty: 53 TABLET | Refills: 0 | Status: SHIPPED | OUTPATIENT
Start: 2018-05-11 | End: 2018-08-14

## 2018-05-11 RX ORDER — VARENICLINE TARTRATE 1 MG/1
1 TABLET, FILM COATED ORAL 2 TIMES DAILY
Qty: 60 TABLET | Refills: 4 | Status: SHIPPED | OUTPATIENT
Start: 2018-05-11 | End: 2018-08-14

## 2018-05-11 NOTE — ASSESSMENT & PLAN NOTE
Encouraged to work on quitting smoking  Patient was given a script to restart using Chantix  We will follow up on her progress when she returns in several months  She was encouraged to call with any problems or concerns in the interim  She is aware the possible side effects with the medication but notes that she never had any problems with that when she used it in the past   She will call if she experiences any side effects

## 2018-05-11 NOTE — PATIENT INSTRUCTIONS
Problem List Items Addressed This Visit     Hyperlipidemia - Primary     Start Lipitor 20 mg daily and recheck in 3 months  Relevant Medications    atorvastatin (LIPITOR) 20 mg tablet    Other Relevant Orders    Lipid Panel with Direct LDL reflex    Comprehensive metabolic panel    Hypertension     Improved  She will continue with metoprolol 100 mg daily  Relevant Orders    Comprehensive metabolic panel    Tobacco abuse     Encouraged to work on quitting smoking  Patient was given a script to restart using Chantix  We will follow up on her progress when she returns in several months  She was encouraged to call with any problems or concerns in the interim  She is aware the possible side effects with the medication but notes that she never had any problems with that when she used it in the past   She will call if she experiences any side effects           Relevant Medications    varenicline (CHANTIX VÍCTOR) 0 5 MG X 11 & 1 MG X 42 tablet    varenicline (CHANTIX) 1 mg tablet

## 2018-06-11 ENCOUNTER — HOSPITAL ENCOUNTER (OUTPATIENT)
Dept: SLEEP CENTER | Facility: CLINIC | Age: 53
Discharge: HOME | End: 2018-06-11
Payer: COMMERCIAL

## 2018-06-11 DIAGNOSIS — G47.19 EXCESSIVE DAYTIME SLEEPINESS: ICD-10-CM

## 2018-06-11 PROCEDURE — G0399 HOME SLEEP TEST/TYPE 3 PORTA: HCPCS | Performed by: INTERNAL MEDICINE

## 2018-06-11 PROCEDURE — G0399 HOME SLEEP TEST/TYPE 3 PORTA: HCPCS

## 2018-06-15 DIAGNOSIS — G47.11 IDIOPATHIC HYPERSOMNIA: Primary | ICD-10-CM

## 2018-06-15 DIAGNOSIS — G47.419 PRIMARY NARCOLEPSY WITHOUT CATAPLEXY: ICD-10-CM

## 2018-06-18 ENCOUNTER — TELEPHONE (OUTPATIENT)
Dept: SLEEP CENTER | Facility: CLINIC | Age: 53
End: 2018-06-18

## 2018-06-18 NOTE — TELEPHONE ENCOUNTER
Informed patient that home sleep testing did not show obstructive sleep apnea  MSLT was ordered by PCP  [facility] with [facility]

## 2018-08-11 NOTE — PROGRESS NOTES
[place] [facility]  FAMILY PRACTICE OFFICE VISIT       NAME: Elina Hobbs  AGE: 50 y o  SEX: female       : 1965        MRN: [id number]    DATE: 8/15/2018  TIME: 1:59 AM    Assessment and Plan     Problem List Items Addressed This Visit     Hyperlipidemia       Patient is currently taking Lipitor 20 mg daily without any side effects  She will continue with this  She was provided a slip reprinted to have her lipid panel recheck it as this was not done prior to today's visit  Relevant Medications    atorvastatin (LIPITOR) 20 mg tablet    Hypertension - Primary       Well controlled  She will continue with metoprolol  mg daily  Tobacco abuse       Patient reports that Chantix was ineffective for her despite using it for a month  She is given a script for nicotine patches try instead  She was encouraged to call towards the end of her 2nd month of these patches to let me know whether she feels like she is doing well and ready to decrease to 14 mg strength or if she feels that she is still having difficulty with cravings and would prefer to stay at the 21 mg strength  Relevant Medications    nicotine (NICODERM CQ) 21 mg/24 hr TD 24 hr patch    Impaired fasting blood sugar      Patient was reminded to limit carbohydrate intake  A1c from May 2018 was 5 7%  Will plan for recheck at next visit  Other Visit Diagnoses     Urinary symptom or sign          Reviewed urine dip results  Started on Bactrim  Urine sent for culture  Will follow up in a few days upon receipt of the culture results  Relevant Medications    sulfamethoxazole-trimethoprim (BACTRIM DS) 800-160 mg per tablet    Other Relevant Orders    Urine culture    POCT urine dip (Completed)          Impaired fasting blood sugar   Patient was reminded to limit carbohydrate intake  A1c from May 2018 was 5 7%  Will plan for recheck at next visit  Hypertension    Well controlled    She will continue with metoprolol  mg daily  Hyperlipidemia    Patient is currently taking Lipitor 20 mg daily without any side effects  She will continue with this  She was provided a slip reprinted to have her lipid panel recheck it as this was not done prior to today's visit  Tobacco abuse    Patient reports that Chantix was ineffective for her despite using it for a month  She is given a script for nicotine patches try instead  She was encouraged to call towards the end of her 2nd month of these patches to let me know whether she feels like she is doing well and ready to decrease to 14 mg strength or if she feels that she is still having difficulty with cravings and would prefer to stay at the 21 mg strength  Chief Complaint     Chief Complaint   Patient presents with    Follow-up     HTN    Possible UTI       History of Present Illness   Janis Woods is a 50y o -year-old female who presents for three-month followup  The patient reports that current blood pressure medications include Metoprolol 100 mg ER daily - in the evening  Blood pressure readings at home are not checked  The patient denies symptoms of poor control such as chest pain, shortness of breath, leg swelling, headaches, or dizziness, but does note that she has trouble with vision being blurry  The patient continues with the Lipitor  20 milligrams daily ( started at last visit 3 months ago )  Lipid panel not repeated prior to today's visit  At her last visit 3 months ago, she was given a script to restart using Chantix to help her discontinue smoking  She reports that since her last visit, she has tried the Chantix for a month but not helpful so she stopped - notes that she still smokes 1/2 ppd  Urinary Tract Infection    This is a new problem  Episode onset: about 3 days  The problem has been gradually worsening  There is no history of pyelonephritis   Associated symptoms include flank pain (bilateral)  Pertinent negatives include no chills, frequency, hematuria, hesitancy, nausea or vomiting  Associated symptoms comments: Feels that she might not be fully emptying  She has tried nothing for the symptoms  Her past medical history is significant for kidney stones  There is no history of recurrent UTIs  but has had these before         Review of Systems   Review of Systems   Constitutional: Negative for chills and fever  Eyes: Positive for visual disturbance  Respiratory: Negative for shortness of breath  Cardiovascular: Negative for chest pain, palpitations and leg swelling  Gastrointestinal: Negative for nausea and vomiting  Genitourinary: Positive for flank pain (bilateral)  Negative for dysuria, frequency, hematuria and hesitancy  Neurological: Negative for dizziness and headaches         Active Problem List     Patient Active Problem List   Diagnosis    Abdominal pain of multiple sites    Alternating constipation and diarrhea    Anemia    Anxiety    Back muscle spasm    [facility] disorder[facility])    Bipolar I disorder, single manic episode ([facility])    Bloating    Breast pain    Cervical polyp    Chronic migraine without aura    Familial cancer of breast ([facility] [address] )    Female pelvic pain    Hyperlipidemia    Hypertension    Mammogram abnormal    Nephrolithiasis    Overactive bladder    Stress incontinence, female    Tobacco abuse    Vertigo    Excessive daytime sleepiness    Thyromegaly    Impaired fasting blood sugar    Obstructive sleep apnea         Past Medical History:  Past Medical History:   Diagnosis Date    Abdominal mass, LLQ (left lower quadrant)     resolved 06/19/2015    Anemia     Anxiety     Bipolar disorder ([facility] )     Cervicalgia     resolved 02/18/2015    Depression     with anxiety per allscripts    Esophageal reflux     resolved 01/12/2015    Fracture of humerus     s/p ORIF R humerus resolved 07/18/2017    GERD (gastroesophageal reflux disease)     Headache     Heartburn     last assessed 10/11/2012    Hypertension     Migraine     Polyuria     resolved 2017    Skull fracture ([address] )     s/p trauma 2008 s/p fall at work    Subdural hemorrhage [facility])     skull fracture    Urinary frequency     resolved 2015    Urinary incontinence     resolved 2017    Vertigo        Past Surgical History:  Past Surgical History:   Procedure Laterality Date    ABDOMINAL SURGERY      abdominal mass    BLADDER SUSPENSION      BONE BIOPSY      open    BREAST LUMPECTOMY      age 34 benign per allscripts    DILATION AND CURETTAGE OF UTERUS      x2 due to bleeding per allscripts    ELBOW SURGERY Right     per allscripts    ENDOMETRIAL ABLATION      vaginal lesion(s) hydrothermal ablation     HUMERUS FRACTURE SURGERY      s/p fall with plate    HYSTEROSCOPY W/ ENDOMETRIAL ABLATION      OTHER SURGICAL HISTORY      surgical treatment for  age 14    Genny Wilbert OVARIAN CYST REMOVAL Left     OVARY SURGERY Right     cystectomy per allscripts    CA COLONOSCOPY FLX DX W/COLLJ SPEC WHEN PFRMD N/A 11/15/2017    Procedure: COLONOSCOPY;  Surgeon: Ena Ugalde MD;  Location: [facility] GI LAB; Service: Gastroenterology    SHOULDER SURGERY Right     TUBAL LIGATION      URETHRAL SLING      mid per allscripts       Family History:  Family History   Problem Relation Age of Onset   Genny Wilbert Breast cancer Mother     Hypertension Mother     Asthma Father     Coronary artery disease Father     Diabetes Father     Hypertension Father     Diabetes type II Father     Breast cancer Sister     Diabetes Other     Heart disease Other     Hypertension Other        Social History:  Social History     Social History    Marital status: /Civil Union     Spouse name: N/A    Number of children: N/A    Years of education: N/A     Occupational History    Not on file       Social History Main Topics    Smoking status: Current Every Day Smoker Packs/day: 0 50    Smokeless tobacco: Never Used      Comment: active smoker per allscripts    Alcohol use Yes      Comment: social, moderate per allscripts    Drug use: No    Sexual activity: Not on file     Other Topics Concern    Not on file     Social History Narrative    Caffeine use    Lives with adult children               Objective     Vitals:    08/14/18 1340   BP: 104/70   BP Location: Left arm   Patient Position: Sitting   Cuff Size: Standard   Pulse: 74   Temp: 98 7 °F (37 1 °C)   Weight: 72 7 kg (160 lb 4 oz)   Height: 5' 2" (1 575 m)     Wt Readings from Last 3 Encounters:   08/14/18 72 7 kg (160 lb 4 oz)   05/11/18 74 kg (163 lb 4 oz)   04/26/18 74 9 kg (165 lb 2 oz)       Physical Exam   Constitutional: She appears well-developed and well-nourished  No distress  Cardiovascular: Normal rate, regular rhythm, normal heart sounds and intact distal pulses  No murmur heard  Pulmonary/Chest: Effort normal and breath sounds normal  She has no wheezes  She has no rales  Abdominal: Soft  Bowel sounds are normal  She exhibits no distension and no mass  There is tenderness in the right upper quadrant and left upper quadrant  There is CVA tenderness (on the right)  There is no rigidity, no rebound and no guarding  Musculoskeletal: She exhibits no edema  Skin: Skin is warm and dry  Psychiatric: She has a normal mood and affect  Vitals reviewed        Pertinent Laboratory/Diagnostic Studies:  Lab Results   Component Value Date    GLUCOSE 107 10/07/2014    BUN 11 05/08/2018    CREATININE 0 95 05/08/2018    CALCIUM 9 0 05/08/2018     05/08/2018    K 3 8 05/08/2018    CO2 32 05/08/2018     05/08/2018     Lab Results   Component Value Date    ALT 30 05/08/2018    AST 25 05/08/2018    ALKPHOS 59 05/08/2018    BILITOT 0 34 05/08/2018       Lab Results   Component Value Date    WBC 4 86 09/05/2017    HGB 15 0 09/05/2017    HCT 44 1 09/05/2017    MCV 89 09/05/2017     09/05/2017 Lab Results   Component Value Date    CHOL 241 (H) 05/08/2018     Lab Results   Component Value Date    TRIG 278 (H) 05/08/2018     Lab Results   Component Value Date    HDL 42 05/08/2018     Lab Results   Component Value Date    LDLCALC 143 (H) 05/08/2018     Lab Results   Component Value Date    HGBA1C 5 7 05/08/2018       Results for orders placed or performed in visit on 08/14/18   POCT urine dip   Result Value Ref Range    LEUKOCYTE ESTERASE,UA Small      NITRITE,UA Negative     SL AMB POCT UROBILINOGEN 0 2     SL AMB POCT URINE PROTEIN 300++      PH,UA 5 0      BLOOD,UA Negative      SPECIFIC GRAVITY,UA 1 030      KETONES,UA Large      BILIRUBIN,UA Small     GLUCOSE, UA Negative      COLOR,UA yellow      CLARITY,UA Cloudy        Orders Placed This Encounter   Procedures    Urine culture    POCT urine dip       ALLERGIES:  Allergies   Allergen Reactions    Codeine GI Intolerance    Erythromycin GI Intolerance    Percolone [Oxycodone] GI Intolerance    Topiramate Other (See Comments)     vomitting       Current Medications     Current Outpatient Prescriptions   Medication Sig Dispense Refill    atorvastatin (LIPITOR) 20 mg tablet Take 1 tablet (20 mg total) by mouth daily 30 tablet 3    clonazePAM (KlonoPIN) 2 mg tablet Take 2 mg by mouth      lamoTRIgine (LaMICtal) 200 MG tablet Take 200 mg by mouth daily at bedtime Take 1 1/2 at bedtime         meclizine (ANTIVERT) 25 mg tablet Take 1 tablet by mouth every 8 (eight) hours as needed for dizziness       metoprolol succinate (TOPROL-XL) 100 mg 24 hr tablet Take 1 tablet (100 mg total) by mouth daily 30 tablet 5    traZODone (DESYREL) 150 mg tablet Take 150 mg by mouth daily        nicotine (NICODERM CQ) 21 mg/24 hr TD 24 hr patch Place 1 patch on the skin every 24 hours 28 patch 1    sulfamethoxazole-trimethoprim (BACTRIM DS) 800-160 mg per tablet Take 1 tablet by mouth every 12 (twelve) hours for 7 days 14 tablet 0     No current facility-administered medications for this visit            Health Maintenance     Health Maintenance   Topic Date Due    HIV SCREENING  1965    Depression Screening PHQ-9  1965    INFLUENZA VACCINE  09/01/2018    MAMMOGRAM  01/16/2019    CRC Screening: Colonoscopy  11/15/2022    DTaP,Tdap,and Td Vaccines (3 - Td) 08/30/2027     Immunization History   Administered Date(s) Administered    Influenza Quadrivalent Preservative Free 3 years and older IM 10/16/2014, 11/11/2016    Influenza Quadrivalent, 6-35 Months IM 11/02/2015, 08/30/2017    Td (adult), adsorbed 08/30/2017    Tdap 01/01/2007, 10/03/2007       Gabe Izaguirre PA-C  8/15/2018 1:59 AM  [facility]

## 2018-08-14 ENCOUNTER — OFFICE VISIT (OUTPATIENT)
Dept: FAMILY MEDICINE CLINIC | Facility: CLINIC | Age: 53
End: 2018-08-14
Payer: COMMERCIAL

## 2018-08-14 VITALS
WEIGHT: 160.25 LBS | SYSTOLIC BLOOD PRESSURE: 104 MMHG | HEIGHT: 62 IN | BODY MASS INDEX: 29.49 KG/M2 | TEMPERATURE: 98.7 F | DIASTOLIC BLOOD PRESSURE: 70 MMHG | HEART RATE: 74 BPM

## 2018-08-14 DIAGNOSIS — R73.01 IMPAIRED FASTING BLOOD SUGAR: ICD-10-CM

## 2018-08-14 DIAGNOSIS — Z72.0 TOBACCO ABUSE: ICD-10-CM

## 2018-08-14 DIAGNOSIS — I10 ESSENTIAL HYPERTENSION: Primary | ICD-10-CM

## 2018-08-14 DIAGNOSIS — E78.5 HYPERLIPIDEMIA, UNSPECIFIED HYPERLIPIDEMIA TYPE: ICD-10-CM

## 2018-08-14 DIAGNOSIS — R39.9 URINARY SYMPTOM OR SIGN: ICD-10-CM

## 2018-08-14 LAB
SL AMB  POCT GLUCOSE, UA: NEGATIVE
SL AMB LEUKOCYTE ESTERASE,UA: ABNORMAL
SL AMB POCT BILIRUBIN,UA: ABNORMAL
SL AMB POCT BLOOD,UA: NEGATIVE
SL AMB POCT CLARITY,UA: ABNORMAL
SL AMB POCT COLOR,UA: YELLOW
SL AMB POCT KETONES,UA: ABNORMAL
SL AMB POCT NITRITE,UA: NEGATIVE
SL AMB POCT PH,UA: 5
SL AMB POCT SPECIFIC GRAVITY,UA: 1.03
SL AMB POCT URINE PROTEIN: ABNORMAL
SL AMB POCT UROBILINOGEN: 0.2

## 2018-08-14 PROCEDURE — 3074F SYST BP LT 130 MM HG: CPT | Performed by: PHYSICIAN ASSISTANT

## 2018-08-14 PROCEDURE — 3078F DIAST BP <80 MM HG: CPT | Performed by: PHYSICIAN ASSISTANT

## 2018-08-14 PROCEDURE — 99214 OFFICE O/P EST MOD 30 MIN: CPT | Performed by: PHYSICIAN ASSISTANT

## 2018-08-14 PROCEDURE — 81002 URINALYSIS NONAUTO W/O SCOPE: CPT | Performed by: PHYSICIAN ASSISTANT

## 2018-08-14 PROCEDURE — 87086 URINE CULTURE/COLONY COUNT: CPT | Performed by: PHYSICIAN ASSISTANT

## 2018-08-14 RX ORDER — ATORVASTATIN CALCIUM 20 MG/1
20 TABLET, FILM COATED ORAL DAILY
Qty: 30 TABLET | Refills: 3 | Status: SHIPPED | OUTPATIENT
Start: 2018-08-14 | End: 2018-11-08 | Stop reason: SDUPTHER

## 2018-08-14 RX ORDER — SULFAMETHOXAZOLE AND TRIMETHOPRIM 800; 160 MG/1; MG/1
1 TABLET ORAL EVERY 12 HOURS SCHEDULED
Qty: 14 TABLET | Refills: 0 | Status: SHIPPED | OUTPATIENT
Start: 2018-08-14 | End: 2018-08-21

## 2018-08-14 RX ORDER — NICOTINE 21 MG/24HR
1 PATCH, TRANSDERMAL 24 HOURS TRANSDERMAL EVERY 24 HOURS
Qty: 28 PATCH | Refills: 1 | Status: SHIPPED | OUTPATIENT
Start: 2018-08-14 | End: 2018-12-18

## 2018-08-15 ENCOUNTER — OFFICE VISIT (OUTPATIENT)
Dept: SLEEP CENTER | Facility: CLINIC | Age: 53
End: 2018-08-15
Payer: COMMERCIAL

## 2018-08-15 VITALS
WEIGHT: 161.6 LBS | DIASTOLIC BLOOD PRESSURE: 80 MMHG | SYSTOLIC BLOOD PRESSURE: 140 MMHG | HEIGHT: 62 IN | BODY MASS INDEX: 29.74 KG/M2 | HEART RATE: 70 BPM

## 2018-08-15 DIAGNOSIS — I10 ESSENTIAL HYPERTENSION: ICD-10-CM

## 2018-08-15 DIAGNOSIS — G47.19 EXCESSIVE DAYTIME SLEEPINESS: Primary | ICD-10-CM

## 2018-08-15 DIAGNOSIS — F31.60 BIPOLAR 1 DISORDER, MIXED (HCC): ICD-10-CM

## 2018-08-15 PROCEDURE — 99204 OFFICE O/P NEW MOD 45 MIN: CPT | Performed by: INTERNAL MEDICINE

## 2018-08-15 NOTE — PATIENT INSTRUCTIONS
Problem List Items Addressed This Visit     Hyperlipidemia       Patient is currently taking Lipitor 20 mg daily without any side effects  She will continue with this  She was provided a slip reprinted to have her lipid panel recheck it as this was not done prior to today's visit  Relevant Medications    atorvastatin (LIPITOR) 20 mg tablet    Hypertension - Primary       Well controlled  She will continue with metoprolol  mg daily  Tobacco abuse       Patient reports that Chantix was ineffective for her despite using it for a month  She is given a script for nicotine patches try instead  She was encouraged to call towards the end of her 2nd month of these patches to let me know whether she feels like she is doing well and ready to decrease to 14 mg strength or if she feels that she is still having difficulty with cravings and would prefer to stay at the 21 mg strength  Relevant Medications    nicotine (NICODERM CQ) 21 mg/24 hr TD 24 hr patch    Impaired fasting blood sugar      Patient was reminded to limit carbohydrate intake  A1c from May 2018 was 5 7%  Will plan for recheck at next visit  Other Visit Diagnoses     Urinary symptom or sign          Reviewed urine dip results  Started on Bactrim  Urine sent for culture  Will follow up in a few days upon receipt of the culture results      Relevant Medications    sulfamethoxazole-trimethoprim (BACTRIM DS) 800-160 mg per tablet    Other Relevant Orders    Urine culture    POCT urine dip (Completed)

## 2018-08-15 NOTE — PROGRESS NOTES
Assessment/Plan:     Diagnoses and all orders for this visit:    Excessive daytime sleepiness    Bipolar 1 disorder, mixed (HCC)    Essential hypertension        Excessive daytime sleepiness with [place] sleepiness score of 12  Recent home sleep study results discussed with the patient with no evidence of obstructive sleep apnea  Discussed with the patient given her symptoms of excessive daytime sleepiness would need to rule out narcolepsy/idiopathic hypersomnia understands and verbalizes  Also discussed excessive daytime sleepiness likely secondary from medications, she is currently on Klonopin 2 mg that she takes at bedtime an hour prior to bedtime, along with Lamictal for her bipolar, and trazodone 150 mg  She states she has followed up with her psychiatrist a week ago and has been on these medications for quite some time and he does not think the medications as a cause of the daytime sleepiness  Also states is no recent change of the dosing of any of these medications  Patient would benefit from a diagnostic multiple sleep latency testing testing procedure was discussed in detail  She will undergo the about testing and follow-up  Cautioned against driving when sleepy  Return in about 4 weeks (around 9/12/2018)  All questions are answered to the patient's satisfaction and understanding  She verbalizes understanding  She is encouraged to call with any further questions or concerns  Portions of the record may have been created with voice recognition software  Occasional wrong word or "sound a like" substitutions may have occurred due to the inherent limitations of voice recognition software  Read the chart carefully and recognize, using context, where substitutions have occurred      Electronically Signed by Romy Santos MD    ______________________________________________________________________    Chief Complaint:   Chief Complaint   Patient presents with    Consult        Patient ID: Sherwin Jer is a 50 y o  y o  female has a past medical history of Abdominal mass, LLQ (left lower quadrant); Anemia; Anxiety; Bipolar disorder ([facility] [address] ); Cervicalgia; Depression; Esophageal reflux; Fracture of humerus; GERD (gastroesophageal reflux disease); Headache; Heartburn; Hypertension; Migraine; Polyuria; Skull fracture ([facility] [address] ) (2001); [facility] ) (2008); Urinary frequency; Urinary incontinence; and Vertigo  8/15/2018  Patient is 50years old, who used to work as a  in the past, not working for the past 7-8 years, states she is on disability currently  She has multiple medical problems including bipolar disorder, following up with psychiatric/therapist, and anxiety issues, she states to goes to bed at around 10:30 a m , falls asleep right away, most of the day she goes to bed at around 10:30 a m  but there a few nights she states that she is up until midnight, and she is out of bed at 7:00 a m  [place] She has 1-2 nocturnal like awakenings for nocturia  But falls asleep right away after she wakes up  When she wakes up in the morning she still tired and fatigued she states she comes down to the family room and falls asleep again in the culture between 830-10 for about an hour to 1 and 0 5 hours  And again in the afternoon she takes a nap for about 2 hours  And then she feels refreshed  She recently had a home sleep study done with no evidence of any obstructive sleep apnea   was accompanied this office visit states that she has some mild snoring, no evidence of witnessed apneic spells  As stated above does not feel refreshed when she wakes up in the morning, no history of any early morning headaches comma symptoms related to restless legs, no nocturnal heartburn, feelings of depression and anxiety are present, lack of concentration and short-term memory loss present          Review of Systems      Genitourinary post menopausal (no peroids)   Cardiology none   Gastrointestinal none Neurology forgetfulness, poor concentration or confusion, , difficulty with memory and balance problems   Constitutional claustrophobia and fatigue   Integumentary rash or dry skin   Psychiatry anxiety, depression and aggressiveness or irritability   Musculoskeletal none   Pulmonary none   ENT none   Endocrine none   Hematological none     Social history: She reports that she has been smoking  She has been smoking about 0 50 packs per day  She has never used smokeless tobacco  She reports that she drinks alcohol  She reports that she does not use drugs  Past surgical history:   Past Surgical History:   Procedure Laterality Date    ABDOMINAL SURGERY      abdominal mass    BLADDER SUSPENSION      BONE BIOPSY      open    BREAST LUMPECTOMY      age 34 benign per allscripts    DILATION AND CURETTAGE OF UTERUS      x2 due to bleeding per allscripts    ELBOW SURGERY Right     per allscripts    ENDOMETRIAL ABLATION      vaginal lesion(s) hydrothermal ablation     HUMERUS FRACTURE SURGERY      s/p fall with plate    HYSTEROSCOPY W/ ENDOMETRIAL ABLATION      OTHER SURGICAL HISTORY      surgical treatment for  age 14    [facility] OVARIAN CYST REMOVAL Left     OVARY SURGERY Right     cystectomy per allscripts    CO COLONOSCOPY FLX DX W/COLLJ SPEC WHEN PFRMD N/A 11/15/2017    Procedure: COLONOSCOPY;  Surgeon: Eunice Fraser MD;  Location[facility];   Service: Gastroenterology    SHOULDER SURGERY Right     TUBAL LIGATION      URETHRAL SLING      mid per allscripts     Family history:   Family History   Problem Relation Age of Onset   Renay Sicks Breast cancer Mother     Hypertension Mother     Asthma Father     Coronary artery disease Father     Diabetes Father     Hypertension Father     Diabetes type II Father     Breast cancer Sister     Diabetes Other     Heart disease Other     Hypertension Other        Immunization History   Administered Date(s) Administered    Influenza Quadrivalent Preservative Free 3 years and older IM 10/16/2014, 11/11/2016    Influenza Quadrivalent, 6-35 Months IM 11/02/2015, 08/30/2017    Td (adult), adsorbed 08/30/2017    Tdap 01/01/2007, 10/03/2007     Current Outpatient Prescriptions   Medication Sig Dispense Refill    atorvastatin (LIPITOR) 20 mg tablet Take 1 tablet (20 mg total) by mouth daily 30 tablet 3    clonazePAM (KlonoPIN) 2 mg tablet Take 2 mg by mouth      lamoTRIgine (LaMICtal) 200 MG tablet Take 200 mg by mouth daily at bedtime Take 1 1/2 at bedtime   meclizine (ANTIVERT) 25 mg tablet Take 1 tablet by mouth every 8 (eight) hours as needed for dizziness       metoprolol succinate (TOPROL-XL) 100 mg 24 hr tablet Take 1 tablet (100 mg total) by mouth daily 30 tablet 5    nicotine (NICODERM CQ) 21 mg/24 hr TD 24 hr patch Place 1 patch on the skin every 24 hours 28 patch 1    sulfamethoxazole-trimethoprim (BACTRIM DS) 800-160 mg per tablet Take 1 tablet by mouth every 12 (twelve) hours for 7 days 14 tablet 0    traZODone (DESYREL) 150 mg tablet Take 150 mg by mouth daily         No current facility-administered medications for this visit  Allergies: Codeine; Erythromycin; Percolone [oxycodone]; and Topiramate    Objective:  Vitals:    08/15/18 1100   BP: 140/80   Pulse: 70   Weight: 73 3 kg (161 lb 9 6 oz)   Height: 5' 2" (1 575 m)        Wt Readings from Last 3 Encounters:   08/15/18 73 3 kg (161 lb 9 6 oz)   08/14/18 72 7 kg (160 lb 4 oz)   05/11/18 74 kg (163 lb 4 oz)     Body mass index is 29 56 kg/m²  Physical Exam   Constitutional: She is oriented to person, place, and time  She appears well-developed and well-nourished  HENT:   Head: Normocephalic and atraumatic  Crowded oropharyngeal airways, Mallampati score 2   Eyes: Conjunctivae and EOM are normal  Pupils are equal, round, and reactive to light  Neck: Normal range of motion  Neck supple  No JVD present  No thyromegaly present     Short and wide neck   Cardiovascular: Normal rate, regular rhythm and normal heart sounds  Exam reveals no gallop and no friction rub  No murmur heard  Pulmonary/Chest: Effort normal and breath sounds normal  No respiratory distress  She has no wheezes  She has no rales  She exhibits no tenderness  Musculoskeletal: Normal range of motion  She exhibits no edema, tenderness or deformity  Lymphadenopathy:     She has no cervical adenopathy  Neurological: She is alert and oriented to person, place, and time  Skin: Skin is warm and dry  Psychiatric: She has a normal mood and affect  Her behavior is normal    Nursing note and vitals reviewed             ESS: Total score: 12

## 2018-08-15 NOTE — ASSESSMENT & PLAN NOTE
Patient is currently taking Lipitor 20 mg daily without any side effects  She will continue with this  She was provided a slip reprinted to have her lipid panel recheck it as this was not done prior to today's visit

## 2018-08-15 NOTE — ASSESSMENT & PLAN NOTE
Patient reports that Chantix was ineffective for her despite using it for a month  She is given a script for nicotine patches try instead  She was encouraged to call towards the end of her 2nd month of these patches to let me know whether she feels like she is doing well and ready to decrease to 14 mg strength or if she feels that she is still having difficulty with cravings and would prefer to stay at the 21 mg strength

## 2018-08-15 NOTE — ASSESSMENT & PLAN NOTE
Patient was reminded to limit carbohydrate intake  A1c from May 2018 was 5 7%  Will plan for recheck at next visit

## 2018-08-16 LAB — BACTERIA UR CULT: NORMAL

## 2018-08-21 ENCOUNTER — TELEPHONE (OUTPATIENT)
Dept: FAMILY MEDICINE CLINIC | Facility: CLINIC | Age: 53
End: 2018-08-21

## 2018-08-21 NOTE — TELEPHONE ENCOUNTER
Felipe with [facility] called today stating that pt was authorized for the mslt but was not authorized for the diagnostic sleep study that is scheduled for tomorrow night  She states both cpt codes were listed on the request they sent to the office but when it was sent back one was crossed out  I called [facility] and submitted for authorization for the diagnostic sleep study - [phone]  Reference #[id number]    Faxed additional clinical information to [phone]

## 2018-09-11 ENCOUNTER — TELEPHONE (OUTPATIENT)
Dept: FAMILY MEDICINE CLINIC | Facility: CLINIC | Age: 53
End: 2018-09-11

## 2018-09-11 NOTE — TELEPHONE ENCOUNTER
I agree  She should not use the patch again  Please offer her still on her meclizine to use if she is still having the vertigo

## 2018-11-08 DIAGNOSIS — E78.5 HYPERLIPIDEMIA, UNSPECIFIED HYPERLIPIDEMIA TYPE: ICD-10-CM

## 2018-11-08 DIAGNOSIS — N20.0 CALCULUS OF KIDNEY: ICD-10-CM

## 2018-11-08 DIAGNOSIS — I10 ESSENTIAL HYPERTENSION: ICD-10-CM

## 2018-11-08 RX ORDER — METOPROLOL SUCCINATE 100 MG/1
100 TABLET, EXTENDED RELEASE ORAL DAILY
Qty: 30 TABLET | Refills: 3 | Status: SHIPPED | OUTPATIENT
Start: 2018-11-08 | End: 2019-03-11 | Stop reason: SDUPTHER

## 2018-11-08 RX ORDER — ATORVASTATIN CALCIUM 20 MG/1
20 TABLET, FILM COATED ORAL DAILY
Qty: 30 TABLET | Refills: 3 | Status: SHIPPED | OUTPATIENT
Start: 2018-11-08 | End: 2019-03-11 | Stop reason: SDUPTHER

## 2018-11-14 ENCOUNTER — HOSPITAL ENCOUNTER (OUTPATIENT)
Dept: ULTRASOUND IMAGING | Facility: HOSPITAL | Age: 53
Discharge: HOME | End: 2018-11-14
Payer: COMMERCIAL

## 2018-11-14 ENCOUNTER — HOSPITAL ENCOUNTER (OUTPATIENT)
Dept: RADIOLOGY | Facility: HOSPITAL | Age: 53
Discharge: HOME | End: 2018-11-14
Payer: COMMERCIAL

## 2018-11-14 DIAGNOSIS — N20.0 CALCULUS OF KIDNEY: ICD-10-CM

## 2018-11-14 PROCEDURE — 76770 US EXAM ABDO BACK WALL COMP: CPT

## 2018-11-14 PROCEDURE — 74018 RADEX ABDOMEN 1 VIEW: CPT

## 2018-11-16 ENCOUNTER — TELEPHONE (OUTPATIENT)
Dept: UROLOGY | Facility: CLINIC | Age: 53
End: 2018-11-16

## 2018-11-16 NOTE — TELEPHONE ENCOUNTER
----- Message from Sharlee Dakins, PA-C sent at 11/16/2018  9:44 AM EST -----  Patient due for FU, needs non-urgent scheduling of this

## 2018-11-16 NOTE — TELEPHONE ENCOUNTER
Spoke with patient  Patient scheduled for 12/27/18 at 1:15 with Talisha Ray PA-C in the [facility]

## 2018-12-07 ENCOUNTER — TELEPHONE (OUTPATIENT)
Dept: UROLOGY | Facility: AMBULATORY SURGERY CENTER | Age: 53
End: 2018-12-07

## 2018-12-07 DIAGNOSIS — N20.0 NEPHROLITHIASIS: Primary | ICD-10-CM

## 2018-12-07 NOTE — TELEPHONE ENCOUNTER
Patient scheduled for Thursday 12/13/18 at 6:30 pm  Patient aware to arrive 15 minutes early to [facility] in [place]  Patient instructed nothing to eat 3 hours prior to appointment, can have clear liquids up until time of appointment  Patient verbalized understanding

## 2018-12-07 NOTE — TELEPHONE ENCOUNTER
Discussed with Malik Mauro PA-C, who advised next available CT stone study  Patient requested to go to [facility]  Patient reports unable to make appointments on Monday or Wednesday  Any other day, any time  Advised patient to continue to monitor symptoms  If patient should become severe, intolerable, or patient develops fever, nausea, vomiting or gross hematuria, instructed to call office  Otherwise, will await CT results

## 2018-12-07 NOTE — TELEPHONE ENCOUNTER
Patient would like a call back for results for her ultrasound and xray results  Patient still has pain and she isn't sure what to do   Please call back

## 2018-12-07 NOTE — TELEPHONE ENCOUNTER
Patient managed by Dr Josefina Winkler, seen in the [facility] office  Patient has history of kidney stones  Last seen in November 2017  Has pending appointment on 12/27/18  Patient recently had KUB and U/S done on 11/14/18, both showed non-obstructing left stone  Patient calling today with intermittent left lower back and left lower abdomen pain for about 1 week  Patient denies any fever, nausea or blood in her urine  Advised patient on KUB and U/S results from November Instructed patient, will discuss with provider regarding any necessary updated imaging and will call her back regarding a plan

## 2018-12-08 ENCOUNTER — HOSPITAL ENCOUNTER (EMERGENCY)
Facility: HOSPITAL | Age: 53
Discharge: HOME | End: 2018-12-08
Attending: EMERGENCY MEDICINE | Admitting: EMERGENCY MEDICINE
Payer: COMMERCIAL

## 2018-12-08 ENCOUNTER — APPOINTMENT (EMERGENCY)
Dept: CT IMAGING | Facility: HOSPITAL | Age: 53
End: 2018-12-08
Payer: COMMERCIAL

## 2018-12-08 VITALS
TEMPERATURE: 98.5 F | OXYGEN SATURATION: 92 % | SYSTOLIC BLOOD PRESSURE: 160 MMHG | HEART RATE: 78 BPM | DIASTOLIC BLOOD PRESSURE: 72 MMHG | RESPIRATION RATE: 15 BRPM

## 2018-12-08 DIAGNOSIS — R10.9 ACUTE LEFT FLANK PAIN: Primary | ICD-10-CM

## 2018-12-08 DIAGNOSIS — R82.81 PYURIA: ICD-10-CM

## 2018-12-08 DIAGNOSIS — N10 PYELONEPHRITIS, ACUTE: ICD-10-CM

## 2018-12-08 DIAGNOSIS — N20.0 RENAL STONE: ICD-10-CM

## 2018-12-08 LAB
ALBUMIN SERPL BCP-MCNC: 3.7 G/DL (ref 3.5–5)
ALP SERPL-CCNC: 83 U/L (ref 46–116)
ALT SERPL W P-5'-P-CCNC: 22 U/L (ref 12–78)
ANION GAP SERPL CALCULATED.3IONS-SCNC: 7 MMOL/L (ref 4–13)
AST SERPL W P-5'-P-CCNC: 17 U/L (ref 5–45)
BACTERIA UR QL AUTO: ABNORMAL /HPF
BASOPHILS # BLD AUTO: 0.06 THOUSANDS/ΜL (ref 0–0.1)
BASOPHILS NFR BLD AUTO: 1 % (ref 0–1)
BILIRUB SERPL-MCNC: 0.26 MG/DL (ref 0.2–1)
BILIRUB UR QL STRIP: NEGATIVE
BUN SERPL-MCNC: 16 MG/DL (ref 5–25)
CALCIUM SERPL-MCNC: 9 MG/DL (ref 8.3–10.1)
CHLORIDE SERPL-SCNC: 101 MMOL/L (ref 100–108)
CLARITY UR: CLEAR
CO2 SERPL-SCNC: 32 MMOL/L (ref 21–32)
COLOR UR: YELLOW
CREAT SERPL-MCNC: 0.97 MG/DL (ref 0.6–1.3)
EOSINOPHIL # BLD AUTO: 0.09 THOUSAND/ΜL (ref 0–0.61)
EOSINOPHIL NFR BLD AUTO: 1 % (ref 0–6)
ERYTHROCYTE [DISTWIDTH] IN BLOOD BY AUTOMATED COUNT: 12 % (ref 11.6–15.1)
GFR SERPL CREATININE-BSD FRML MDRD: 68 ML/MIN/1.73SQ M
GLUCOSE SERPL-MCNC: 112 MG/DL (ref 65–140)
GLUCOSE UR STRIP-MCNC: NEGATIVE MG/DL
HCT VFR BLD AUTO: 47 % (ref 34.8–46.1)
HGB BLD-MCNC: 15.5 G/DL (ref 11.5–15.4)
HGB UR QL STRIP.AUTO: NEGATIVE
IMM GRANULOCYTES # BLD AUTO: 0.01 THOUSAND/UL (ref 0–0.2)
IMM GRANULOCYTES NFR BLD AUTO: 0 % (ref 0–2)
KETONES UR STRIP-MCNC: NEGATIVE MG/DL
LEUKOCYTE ESTERASE UR QL STRIP: ABNORMAL
LYMPHOCYTES # BLD AUTO: 3.58 THOUSANDS/ΜL (ref 0.6–4.47)
LYMPHOCYTES NFR BLD AUTO: 53 % (ref 14–44)
MCH RBC QN AUTO: 30.5 PG (ref 26.8–34.3)
MCHC RBC AUTO-ENTMCNC: 33 G/DL (ref 31.4–37.4)
MCV RBC AUTO: 93 FL (ref 82–98)
MONOCYTES # BLD AUTO: 0.52 THOUSAND/ΜL (ref 0.17–1.22)
MONOCYTES NFR BLD AUTO: 8 % (ref 4–12)
NEUTROPHILS # BLD AUTO: 2.45 THOUSANDS/ΜL (ref 1.85–7.62)
NEUTS SEG NFR BLD AUTO: 37 % (ref 43–75)
NITRITE UR QL STRIP: NEGATIVE
NON-SQ EPI CELLS URNS QL MICRO: ABNORMAL /HPF
NRBC BLD AUTO-RTO: 0 /100 WBCS
PH UR STRIP.AUTO: 5.5 [PH] (ref 4.5–8)
PLATELET # BLD AUTO: 221 THOUSANDS/UL (ref 149–390)
PMV BLD AUTO: 9.8 FL (ref 8.9–12.7)
POTASSIUM SERPL-SCNC: 3.6 MMOL/L (ref 3.5–5.3)
PROT SERPL-MCNC: 7.7 G/DL (ref 6.4–8.2)
PROT UR STRIP-MCNC: NEGATIVE MG/DL
RBC # BLD AUTO: 5.08 MILLION/UL (ref 3.81–5.12)
RBC #/AREA URNS AUTO: ABNORMAL /HPF
SODIUM SERPL-SCNC: 140 MMOL/L (ref 136–145)
SP GR UR STRIP.AUTO: 1.02 (ref 1–1.03)
UROBILINOGEN UR QL STRIP.AUTO: 0.2 E.U./DL
WBC # BLD AUTO: 6.71 THOUSAND/UL (ref 4.31–10.16)
WBC #/AREA URNS AUTO: ABNORMAL /HPF

## 2018-12-08 PROCEDURE — 85025 COMPLETE CBC W/AUTO DIFF WBC: CPT | Performed by: PHYSICIAN ASSISTANT

## 2018-12-08 PROCEDURE — 87086 URINE CULTURE/COLONY COUNT: CPT

## 2018-12-08 PROCEDURE — 74176 CT ABD & PELVIS W/O CONTRAST: CPT

## 2018-12-08 PROCEDURE — 96374 THER/PROPH/DIAG INJ IV PUSH: CPT

## 2018-12-08 PROCEDURE — 36415 COLL VENOUS BLD VENIPUNCTURE: CPT | Performed by: PHYSICIAN ASSISTANT

## 2018-12-08 PROCEDURE — 80053 COMPREHEN METABOLIC PANEL: CPT | Performed by: PHYSICIAN ASSISTANT

## 2018-12-08 PROCEDURE — 99284 EMERGENCY DEPT VISIT MOD MDM: CPT

## 2018-12-08 PROCEDURE — 81001 URINALYSIS AUTO W/SCOPE: CPT

## 2018-12-08 RX ORDER — METHOCARBAMOL 500 MG/1
500 TABLET, FILM COATED ORAL ONCE
Status: COMPLETED | OUTPATIENT
Start: 2018-12-08 | End: 2018-12-08

## 2018-12-08 RX ORDER — IBUPROFEN 400 MG/1
400 TABLET ORAL EVERY 6 HOURS PRN
Qty: 30 TABLET | Refills: 0 | Status: SHIPPED | OUTPATIENT
Start: 2018-12-08 | End: 2018-12-18

## 2018-12-08 RX ORDER — SULFAMETHOXAZOLE AND TRIMETHOPRIM 800; 160 MG/1; MG/1
1 TABLET ORAL ONCE
Status: COMPLETED | OUTPATIENT
Start: 2018-12-08 | End: 2018-12-08

## 2018-12-08 RX ORDER — ACETAMINOPHEN 500 MG
500 TABLET ORAL EVERY 6 HOURS PRN
Qty: 30 TABLET | Refills: 0 | Status: SHIPPED | OUTPATIENT
Start: 2018-12-08 | End: 2018-12-18

## 2018-12-08 RX ORDER — SULFAMETHOXAZOLE AND TRIMETHOPRIM 800; 160 MG/1; MG/1
1 TABLET ORAL 2 TIMES DAILY
Qty: 14 TABLET | Refills: 0 | Status: SHIPPED | OUTPATIENT
Start: 2018-12-08 | End: 2018-12-22

## 2018-12-08 RX ORDER — KETOROLAC TROMETHAMINE 30 MG/ML
15 INJECTION, SOLUTION INTRAMUSCULAR; INTRAVENOUS ONCE
Status: COMPLETED | OUTPATIENT
Start: 2018-12-08 | End: 2018-12-08

## 2018-12-08 RX ORDER — LIDOCAINE 50 MG/G
1 PATCH TOPICAL ONCE
Status: DISCONTINUED | OUTPATIENT
Start: 2018-12-08 | End: 2018-12-09 | Stop reason: HOSPADM

## 2018-12-08 RX ORDER — LIDOCAINE 50 MG/G
1 PATCH TOPICAL DAILY
Qty: 30 PATCH | Refills: 0 | Status: SHIPPED | OUTPATIENT
Start: 2018-12-08 | End: 2019-05-21

## 2018-12-08 RX ORDER — ACETAMINOPHEN 325 MG/1
975 TABLET ORAL ONCE
Status: COMPLETED | OUTPATIENT
Start: 2018-12-08 | End: 2018-12-08

## 2018-12-08 RX ADMIN — METHOCARBAMOL 500 MG: 500 TABLET, FILM COATED ORAL at 22:40

## 2018-12-08 RX ADMIN — LIDOCAINE 1 PATCH: 50 PATCH TOPICAL at 22:41

## 2018-12-08 RX ADMIN — KETOROLAC TROMETHAMINE 15 MG: 30 INJECTION, SOLUTION INTRAMUSCULAR at 21:44

## 2018-12-08 RX ADMIN — SULFAMETHOXAZOLE AND TRIMETHOPRIM 1 TABLET: 800; 160 TABLET ORAL at 22:41

## 2018-12-08 RX ADMIN — ACETAMINOPHEN 975 MG: 325 TABLET ORAL at 20:48

## 2018-12-09 NOTE — DISCHARGE INSTRUCTIONS
Flank Pain   WHAT YOU NEED TO KNOW:   Flank pain is felt in the area below your ribcage and above your hip bones, often in the lower back  Your pain may be dull or so severe that you cannot get comfortable  The pain may stay in one area or radiate to another area  It may worsen and lighten in waves  Flank pain is often a sign of problems with your urinary tract, such as a kidney stone or infection  DISCHARGE INSTRUCTIONS:   Return to the emergency department if:   · You have a fever  · Your heart is fluttering or jumping  · You see blood in your urine  · Your pain radiates into your lower abdomen and genital area  · You have intense pain in your low back next to your spine  · You are much more tired than usual and have no desire to eat  · You have a headache and your muscles jerk  Contact your healthcare provider if:   · You have an upset stomach and are vomiting  · You have to urinate more often, and with urgency  · Your pain worsens or does not improve, and you cannot get comfortable  · You pass a stone when you urinate  · You have questions or concerns about your condition or care  Medicines: The following medicines may be ordered for you:  · Pain medicine  may help decrease or relieve your pain  Do not wait until the pain is severe before you take your medicine  · Antibiotics  may help treat a urinary tract infection caused by bacteria  · Take your medicine as directed  Contact your healthcare provider if you think your medicine is not helping or if you have side effects  Tell him of her if you are allergic to any medicine  Keep a list of the medicines, vitamins, and herbs you take  Include the amounts, and when and why you take them  Bring the list or the pill bottles to follow-up visits  Carry your medicine list with you in case of an emergency    Follow up with your healthcare provider in 1 to 2 days or as directed:  Write down your questions so you remember to ask them during your visits  © 2017 [employer] Information is for End User's use only and may not be sold, redistributed or otherwise used for commercial purposes  All illustrations and images included in CareNotes® are the copyrighted property of A D A M , [employer]  or [employer]  The above information is an  only  It is not intended as medical advice for individual conditions or treatments  Talk to your doctor, nurse or pharmacist before following any medical regimen to see if it is safe and effective for you  Kidney Infection   WHAT YOU NEED TO KNOW:   A kidney infection, or pyelonephritis, is a bacterial infection  The infection usually starts in your bladder or urethra and moves into your kidney  One or both kidneys may be infected  DISCHARGE INSTRUCTIONS:   Return to the emergency department if:   · You have a fever and chills  · You cannot stop vomiting  · You have severe pain in your abdomen, lower back, or sides  Contact your healthcare provider if:   · You continue to have a fever after you take antibiotics for 3 days  · You have pain when you urinate, even after treatment  · Your signs and symptoms return  · You have questions or concerns about your condition or care  Medicines: You may  need any of the following:  · Antibiotics  treat your bacterial infection  · Acetaminophen  decreases pain and fever  It is available without a doctor's order  Ask how much to take and how often to take it  Follow directions  Read the labels of all other medicines you are using to see if they also contain acetaminophen, or ask your doctor or pharmacist  Acetaminophen can cause liver damage if not taken correctly  Do not use more than 4 grams (4,000 milligrams) total of acetaminophen in one day  · NSAIDs , such as ibuprofen, help decrease swelling, pain, and fever  This medicine is available with or without a doctor's order  NSAIDs can cause stomach bleeding or kidney problems in certain people  If you take blood thinner medicine, always ask if NSAIDs are safe for you  Always read the medicine label and follow directions  Do not give these medicines to children under 10months of age without direction from your child's healthcare provider  · Prescription pain medicine  may be given  Ask how to take this medicine safely  · Take your medicine as directed  Contact your healthcare provider if you think your medicine is not helping or if you have side effects  Tell him of her if you are allergic to any medicine  Keep a list of the medicines, vitamins, and herbs you take  Include the amounts, and when and why you take them  Bring the list or the pill bottles to follow-up visits  Carry your medicine list with you in case of an emergency  Drink liquids as directed: You may need to drink extra liquids to help flush your kidneys and urinary system  Water is the best liquid to drink  Ask your healthcare provider how much liquid to drink each day and which liquids are best for you  Urinate as soon as you feel the urge: This will help flush bacteria from your urinary system  Do not wait or hold your urine for too long  Clean your genital area every day with soap and water:  Wipe from front to back after you urinate or have a bowel movement  Wear cotton underwear  Fabrics such as nylon and polyester can stay damp  This can increase your risk for infection  Urinate within 15 minutes after you have sex  Follow up with your healthcare provider as directed:  Write down your questions so you remember to ask them during your visits  © 2017 [employer] Information is for End User's use only and may not be sold, redistributed or otherwise used for commercial purposes  All illustrations and images included in CareNotes® are the copyrighted property of [employer]  or [employer]    The above information is an  only  It is not intended as medical advice for individual conditions or treatments  Talk to your doctor, nurse or pharmacist before following any medical regimen to see if it is safe and effective for you

## 2018-12-09 NOTE — ED PROVIDER NOTES
History  Chief Complaint   Patient presents with    Flank Pain     pt reports she was sent by urologist because she has kidney stones on the left side that are causing pain  ct scan scheduled next week  no medications pta  Flank Pain   Pain location:  L flank  Pain quality: aching    Pain severity:  Moderate  Onset quality:  Gradual  Timing:  Constant  Progression:  Waxing and waning  Chronicity:  New  Context: not medication withdrawal, not previous surgeries, not retching, not sick contacts, not suspicious food intake and not trauma    Relieved by:  Nothing  Worsened by:  Nothing  Ineffective treatments:  None tried  Associated symptoms: no anorexia, no cough, no dysuria, no fever, no hematemesis, no hematuria, no melena, no nausea, no vaginal bleeding, no vaginal discharge and no vomiting    Risk factors: obesity    Risk factors: not pregnant        Prior to Admission Medications   Prescriptions Last Dose Informant Patient Reported? Taking?   atorvastatin (LIPITOR) 20 mg tablet   No No   Sig: Take 1 tablet (20 mg total) by mouth daily   clonazePAM (KlonoPIN) 2 mg tablet   Yes No   Sig: Take 2 mg by mouth   lamoTRIgine (LaMICtal) 200 MG tablet   Yes No   Sig: Take 200 mg by mouth daily at bedtime Take 1 1/2 at bedtime      meclizine (ANTIVERT) 25 mg tablet   Yes No   Sig: Take 1 tablet by mouth every 8 (eight) hours as needed for dizziness    metoprolol succinate (TOPROL-XL) 100 mg 24 hr tablet   No No   Sig: Take 1 tablet (100 mg total) by mouth daily   nicotine (NICODERM CQ) 21 mg/24 hr TD 24 hr patch   No No   Sig: Place 1 patch on the skin every 24 hours   traZODone (DESYREL) 150 mg tablet   Yes No   Sig: Take 150 mg by mouth daily        Facility-Administered Medications: None       Past Medical History:   Diagnosis Date    Abdominal mass, LLQ (left lower quadrant)     resolved 06/19/2015    Anemia     Anxiety     Bipolar disorder ([facility] [address] )     Cervicalgia     resolved 02/18/2015    Depression with anxiety per allscripts    Esophageal reflux     resolved 2015    Fracture of humerus     s/p ORIF R humerus resolved 2017    GERD (gastroesophageal reflux disease)     Headache     Heartburn     last assessed 10/11/2012    Hypertension     Migraine     Polyuria     resolved 2017    Skull fracture ([address] ) 2001    s/p trauma 2008 s/p fall at work    Subdural hemorrhage [facility])     skull fracture    Urinary frequency     resolved 2015    Urinary incontinence     resolved 2017    Vertigo        Past Surgical History:   Procedure Laterality Date    ABDOMINAL SURGERY      abdominal mass    BLADDER SUSPENSION      BONE BIOPSY      open    BREAST LUMPECTOMY      age 34 benign per allscripts    DILATION AND CURETTAGE OF UTERUS      x2 due to bleeding per allscripts    ELBOW SURGERY Right     per allscripts    ENDOMETRIAL ABLATION      vaginal lesion(s) hydrothermal ablation     HUMERUS FRACTURE SURGERY      s/p fall with plate    HYSTEROSCOPY W/ ENDOMETRIAL ABLATION      OTHER SURGICAL HISTORY      surgical treatment for  age 14    Yessica Aaron OVARIAN CYST REMOVAL Left     OVARY SURGERY Right     cystectomy per allscripts    SC COLONOSCOPY FLX DX W/COLLJ SPEC WHEN PFRMD N/A 11/15/2017    Procedure: COLONOSCOPY;  Surgeon: Manjula Nj MD;  Location: [facility]; Service: Gastroenterology    SHOULDER SURGERY Right     TUBAL LIGATION      URETHRAL SLING      mid per allscripts       Family History   Problem Relation Age of Onset   Yessica Aaron Breast cancer Mother     Hypertension Mother     Asthma Father     Coronary artery disease Father     Diabetes Father     Hypertension Father     Diabetes type II Father     Breast cancer Sister     Diabetes Other     Heart disease Other     Hypertension Other      I have reviewed and agree with the history as documented      Social History   Substance Use Topics    Smoking status: Current Every Day Smoker     Packs/day: 0 50    Smokeless tobacco: Never Used      Comment: active smoker per allscripts    Alcohol use Yes      Comment: social, moderate per allscripts        Review of Systems   Constitutional: Negative for fever  HENT: Negative for postnasal drip  Eyes: Negative for pain  Respiratory: Negative for cough  Cardiovascular: Negative for leg swelling  Gastrointestinal: Negative for anorexia, hematemesis, melena, nausea and vomiting  Endocrine: Negative for polyphagia  Genitourinary: Positive for flank pain  Negative for dysuria, hematuria, vaginal bleeding and vaginal discharge  Musculoskeletal: Positive for back pain  Negative for gait problem  Skin: Negative for rash  Allergic/Immunologic: Negative for food allergies  Neurological: Negative for facial asymmetry  Hematological: Does not bruise/bleed easily  Psychiatric/Behavioral: Negative for confusion  Physical Exam  Physical Exam   Constitutional: She appears well-developed and well-nourished  She appears distressed ( distress secondary to pain )  HENT:   Head: Normocephalic and atraumatic  Eyes: Conjunctivae are normal    Neck: Neck supple  No JVD present  Cardiovascular: Normal rate  Pulmonary/Chest: Effort normal    Abdominal: She exhibits no distension and no mass  There is tenderness  There is no rebound and no guarding  No hernia         Musculoskeletal:        Back:        Vital Signs  ED Triage Vitals   Temperature Pulse Respirations Blood Pressure SpO2   12/08/18 2007 12/08/18 2007 12/08/18 2007 12/08/18 2008 12/08/18 2007   98 5 °F (36 9 °C) 81 16 161/75 94 %      Temp Source Heart Rate Source Patient Position - Orthostatic VS BP Location FiO2 (%)   12/08/18 2007 12/08/18 2007 12/08/18 2007 12/08/18 2007 --   Temporal Monitor Sitting Right arm       Pain Score       12/08/18 2047       8           Vitals:    12/08/18 2007 12/08/18 2008 12/08/18 2211   BP:  161/75 160/72   Pulse: 81  78   Patient Position - Orthostatic VS: Sitting  Lying       Visual Acuity      ED Medications  Medications   acetaminophen (TYLENOL) tablet 975 mg (975 mg Oral Given 12/8/18 2048)   ketorolac (TORADOL) injection 15 mg (15 mg Intravenous Given 12/8/18 2144)   methocarbamol (ROBAXIN) tablet 500 mg (500 mg Oral Given 12/8/18 2240)   sulfamethoxazole-trimethoprim (BACTRIM DS) 800-160 mg per tablet 1 tablet (1 tablet Oral Given 12/8/18 2241)       Diagnostic Studies  Results Reviewed     Procedure Component Value Units Date/Time    Comprehensive metabolic panel [[id number]] Collected:  12/08/18 2037    Lab Status:  Final result Specimen:  Blood from Arm, Left Updated:  12/08/18 2107     Sodium 140 mmol/L      Potassium 3 6 mmol/L      Chloride 101 mmol/L      CO2 32 mmol/L      ANION GAP 7 mmol/L      BUN 16 mg/dL      Creatinine 0 97 mg/dL      Glucose 112 mg/dL      Calcium 9 0 mg/dL      AST 17 U/L      ALT 22 U/L      Alkaline Phosphatase 83 U/L      Total Protein 7 7 g/dL      Albumin 3 7 g/dL      Total Bilirubin 0 26 mg/dL      eGFR 68 ml/min/1 73sq m     Narrative:         National Kidney Disease Education Program recommendations are as follows:  GFR calculation is accurate only with a steady state creatinine  Chronic Kidney disease less than 60 ml/min/1 73 sq  meters  Kidney failure less than 15 ml/min/1 73 sq  meters  Urine Microscopic [[id number]]  (Abnormal) Collected:  12/08/18 2035    Lab Status:  Final result Specimen:  Urine from Urine, Clean Catch Updated:  12/08/18 2105     RBC, UA 0-1 (A) /hpf      WBC, UA 10-20 (A) /hpf      Epithelial Cells Occasional /hpf      Bacteria, UA Moderate (A) /hpf     Urine culture [[id number]] Collected:  12/08/18 2035    Lab Status:   In process Specimen:  Urine from Urine, Clean Catch Updated:  12/08/18 2105    CBC and differential [[id number]]  (Abnormal) Collected:  12/08/18 2037    Lab Status:  Final result Specimen:  Blood from Arm, Left Updated:  12/08/18 2044     WBC 6 71 Thousand/uL      RBC 5 08 Million/uL      Hemoglobin 15 5 (H) g/dL      Hematocrit 47 0 (H) %      MCV 93 fL      MCH 30 5 pg      MCHC 33 0 g/dL      RDW 12 0 %      MPV 9 8 fL      Platelets 673 Thousands/uL      nRBC 0 /100 WBCs      Neutrophils Relative 37 (L) %      Immat GRANS % 0 %      Lymphocytes Relative 53 (H) %      Monocytes Relative 8 %      Eosinophils Relative 1 %      Basophils Relative 1 %      Neutrophils Absolute 2 45 Thousands/µL      Immature Grans Absolute 0 01 Thousand/uL      Lymphocytes Absolute 3 58 Thousands/µL      Monocytes Absolute 0 52 Thousand/µL      Eosinophils Absolute 0 09 Thousand/µL      Basophils Absolute 0 06 Thousands/µL     POCT urinalysis dipstick [[id number]]  (Abnormal) Resulted:  12/08/18 2022    Lab Status:  Final result Updated:  12/08/18 2022    ED Urine Macroscopic [[id number]]  (Abnormal) Collected:  12/08/18 2035    Lab Status:  Final result Specimen:  Urine Updated:  12/08/18 2021     Color, UA Yellow     Clarity, UA Clear     pH, UA 5 5     Leukocytes, UA Moderate (A)     Nitrite, UA Negative     Protein, UA Negative mg/dl      Glucose, UA Negative mg/dl      Ketones, UA Negative mg/dl      Urobilinogen, UA 0 2 E U /dl      Bilirubin, UA Negative     Blood, UA Negative     Specific Gravity, UA 1 020    Narrative:       CLINITEK RESULT                 CT renal stone study abdomen pelvis without contrast   Final Result by Laureen Anne MD (12/08 2117)   There are nonobstructing intrarenal calculi on the left measuring on the order of 2 mm  No hydronephrosis or hydroureter  Workstation performed: [id number]                    Procedures  Procedures       Phone Contacts  ED Phone Contact    ED Course                               MDM  Number of Diagnoses or Management Options  Acute left flank pain:   Pyelonephritis, acute:   Pyuria:   Renal stone:   Diagnosis management comments: 59-year-old female presents to the emergency department for left flank pain and low back pain    Patient states that she was seen by Urology is set to have a stone study but has had progressive symptoms  Patient denies nausea vomiting  Patient denies fever  Patient denies gross hematuria  Patient denies vaginal discharge  Patient denies lower pelvic pain  Labs reviewed  CT reviewed  No evidence of obstructing renal calculi  No evidence of diverticulitis  Labs are suggestive of early pyelonephritis and will treat as such  Educated patient of diagnosis and home management  Encourage patient take antibiotics as prescribed  Encourage close follow-up with her primary care  Educated patient persistent or worsening signs symptoms and follow up with primary and/or return to the emergency department  Patient and female significant other admit to understanding and agreement  Patient was discharged in ambulatory stable condition  CritCare Time    Disposition  Final diagnoses:   Acute left flank pain   Pyelonephritis, acute   Pyuria   Renal stone     Time reflects when diagnosis was documented in both MDM as applicable and the Disposition within this note     Time User Action Codes Description Comment    12/8/2018 10:20 PM Eve Mike Add [R10 9] Acute left flank pain     12/8/2018 10:20 PM Eve Mike Add [N10] Pyelonephritis, acute     12/8/2018 10:20 PM Eve Mike Add [N39 0] Pyuria     12/8/2018 10:20 PM Ori Mobleyrra Add [N20 0] Renal stone       ED Disposition     ED Disposition Condition Comment    Discharge  Janis Ratliff discharge to home/self care      Condition at discharge: Stable        Follow-up Information     Follow up With Specialties Details Why Contact Info    [facility], Physician Assistant In 2 days  [address]  [facility]  Ajit Parry   [address]  [id number] [phone]            Discharge Medication List as of 12/8/2018 10:24 PM      START taking these medications    Details   acetaminophen (TYLENOL) 500 mg tablet Take 1 tablet (500 mg total) by mouth every 6 (six) hours as needed for mild pain, moderate pain, headaches or fever, Starting Sat 12/8/2018, Normal      ibuprofen (MOTRIN) 400 mg tablet Take 1 tablet (400 mg total) by mouth every 6 (six) hours as needed for mild pain, moderate pain or fever, Starting Sat 12/8/2018, Normal      lidocaine (LIDODERM) 5 % Apply 1 patch topically daily Remove & Discard patch within 12 hours or as directed by MD, Starting Sat 12/8/2018, Normal      sulfamethoxazole-trimethoprim (BACTRIM DS) 800-160 mg per tablet Take 1 tablet by mouth 2 (two) times a day for 14 days smx-tmp DS (BACTRIM) 800-160 mg tabs (1tab q12 D10), Starting Sat 12/8/2018, Until Sat 12/22/2018, Normal         CONTINUE these medications which have NOT CHANGED    Details   atorvastatin (LIPITOR) 20 mg tablet Take 1 tablet (20 mg total) by mouth daily, Starting Thu 11/8/2018, Normal      clonazePAM (KlonoPIN) 2 mg tablet Take 2 mg by mouth, Historical Med      lamoTRIgine (LaMICtal) 200 MG tablet Take 200 mg by mouth daily at bedtime Take 1 1/2 at bedtime  , Historical Med      meclizine (ANTIVERT) 25 mg tablet Take 1 tablet by mouth every 8 (eight) hours as needed for dizziness , Starting Thu 10/5/2017, Historical Med      metoprolol succinate (TOPROL-XL) 100 mg 24 hr tablet Take 1 tablet (100 mg total) by mouth daily, Starting Thu 11/8/2018, Normal      nicotine (NICODERM CQ) 21 mg/24 hr TD 24 hr patch Place 1 patch on the skin every 24 hours, Starting Tue 8/14/2018, Normal      traZODone (DESYREL) 150 mg tablet Take 150 mg by mouth daily  , Historical Med           No discharge procedures on file      ED Provider  Electronically Signed by           Lorena Kirby PA-C  12/09/18 2034

## 2018-12-10 LAB — BACTERIA UR CULT: NORMAL

## 2018-12-18 ENCOUNTER — OFFICE VISIT (OUTPATIENT)
Dept: FAMILY MEDICINE CLINIC | Facility: CLINIC | Age: 53
End: 2018-12-18
Payer: COMMERCIAL

## 2018-12-18 VITALS
HEART RATE: 72 BPM | SYSTOLIC BLOOD PRESSURE: 130 MMHG | DIASTOLIC BLOOD PRESSURE: 84 MMHG | TEMPERATURE: 98.8 F | RESPIRATION RATE: 14 BRPM | BODY MASS INDEX: 28.91 KG/M2 | WEIGHT: 157.13 LBS | OXYGEN SATURATION: 94 % | HEIGHT: 62 IN

## 2018-12-18 DIAGNOSIS — I10 ESSENTIAL HYPERTENSION: ICD-10-CM

## 2018-12-18 DIAGNOSIS — Z20.828 EXPOSURE TO INFLUENZA: ICD-10-CM

## 2018-12-18 DIAGNOSIS — J06.9 VIRAL UPPER RESPIRATORY TRACT INFECTION: Primary | ICD-10-CM

## 2018-12-18 DIAGNOSIS — Z72.0 TOBACCO ABUSE: ICD-10-CM

## 2018-12-18 DIAGNOSIS — R73.01 IMPAIRED FASTING BLOOD SUGAR: ICD-10-CM

## 2018-12-18 DIAGNOSIS — E78.5 HYPERLIPIDEMIA, UNSPECIFIED HYPERLIPIDEMIA TYPE: ICD-10-CM

## 2018-12-18 PROCEDURE — 3075F SYST BP GE 130 - 139MM HG: CPT | Performed by: PHYSICIAN ASSISTANT

## 2018-12-18 PROCEDURE — 99214 OFFICE O/P EST MOD 30 MIN: CPT | Performed by: PHYSICIAN ASSISTANT

## 2018-12-18 PROCEDURE — 3008F BODY MASS INDEX DOCD: CPT | Performed by: PHYSICIAN ASSISTANT

## 2018-12-18 PROCEDURE — 3079F DIAST BP 80-89 MM HG: CPT | Performed by: PHYSICIAN ASSISTANT

## 2018-12-18 RX ORDER — OSELTAMIVIR PHOSPHATE 75 MG/1
75 CAPSULE ORAL DAILY
Qty: 10 CAPSULE | Refills: 0 | Status: SHIPPED | OUTPATIENT
Start: 2018-12-18 | End: 2018-12-28

## 2018-12-18 RX ORDER — CARBAMAZEPINE 200 MG/1
200 TABLET ORAL DAILY
COMMUNITY
Start: 2018-11-28

## 2018-12-18 RX ORDER — TRAZODONE HYDROCHLORIDE 300 MG/1
300 TABLET ORAL
COMMUNITY
Start: 2018-11-30 | End: 2019-09-24 | Stop reason: DRUGHIGH

## 2018-12-18 NOTE — ASSESSMENT & PLAN NOTE
Patient was encouraged to try to gradually cut back on her smoking and eventually quit altogether  She has not had success with Chantix or patches in the past   She expresses interest in pursuing hypnosis  Will continue to monitor

## 2018-12-18 NOTE — PROGRESS NOTES
[facility]  FAMILY PRACTICE ACUTE OFFICE VISIT       NAME: Roni Nieves  AGE: 48 y o  SEX: female       : 1965        MRN: [id number]    DATE: 2018  TIME: 12:29 PM    Assessment and Plan     Problem List Items Addressed This Visit     Hyperlipidemia     Patient is currently taking atorvastatin 20 mg daily  She was given a slip to recheck lipid panel prior to next visit in about 2 months  Relevant Orders    Lipid Panel with Direct LDL reflex    Hypertension     We reviewed that her blood pressure was initially elevated but did improve by the end of her visit  She notes that she was getting some elevated readings last week but was experiencing pain from kidney stone at that time  She was encouraged to continue to monitor her blood pressure at home  If she finds that she has any persistent elevations, she was encouraged to give us a call  Otherwise she was directed to follow up in about 2 months for recheck on her blood pressure with blood work as ordered today prior to that visit  She will continue for now with her metoprolol 100 mg daily  Tobacco abuse     Patient was encouraged to try to gradually cut back on her smoking and eventually quit altogether  She has not had success with Chantix or patches in the past   She expresses interest in pursuing hypnosis  Will continue to monitor  Impaired fasting blood sugar     Patient was given a slip to recheck A1c prior to next visit in about 2 months  Relevant Orders    Comprehensive metabolic panel    Hemoglobin A1C      Other Visit Diagnoses     Viral upper respiratory tract infection    -  Primary    Encouraged to increase fluids and rest   Reassured should improve over the next few days  His symptoms worsen or fail to improve  Relevant Medications    oseltamivir (TAMIFLU) 75 mg capsule    Exposure to influenza        Patient is caring for her grandson who has been diagnosed with flu  She was given Tamiflu prophylaxis  Relevant Medications    oseltamivir (TAMIFLU) 75 mg capsule          Impaired fasting blood sugar  Patient was given a slip to recheck A1c prior to next visit in about 2 months  Hypertension  We reviewed that her blood pressure was initially elevated but did improve by the end of her visit  She notes that she was getting some elevated readings last week but was experiencing pain from kidney stone at that time  She was encouraged to continue to monitor her blood pressure at home  If she finds that she has any persistent elevations, she was encouraged to give us a call  Otherwise she was directed to follow up in about 2 months for recheck on her blood pressure with blood work as ordered today prior to that visit  She will continue for now with her metoprolol 100 mg daily  Hyperlipidemia  Patient is currently taking atorvastatin 20 mg daily  She was given a slip to recheck lipid panel prior to next visit in about 2 months  Tobacco abuse  Patient was encouraged to try to gradually cut back on her smoking and eventually quit altogether  She has not had success with Chantix or patches in the past   She expresses interest in pursuing hypnosis  Will continue to monitor  Chief Complaint     Chief Complaint   Patient presents with    URI     coughing, running nose for 5 days        History of Present Illness   Janis Joseph is a 48y o -year-old female who presents for cold symptoms  URI    This is a new problem  Episode onset: began about 5 days ago  The problem has been unchanged  There has been no fever  Associated symptoms include congestion, coughing and wheezing (hears with coughing and then resolves)  Pertinent negatives include no diarrhea, ear pain, headaches, nausea, rhinorrhea, sore throat or vomiting  She has tried nothing for the symptoms  Review of Systems   Review of Systems   Constitutional: Negative for chills and fever     HENT: Positive for congestion  Negative for ear pain, postnasal drip, rhinorrhea, sinus pressure and sore throat  Respiratory: Positive for cough, chest tightness and wheezing (hears with coughing and then resolves)  Negative for shortness of breath  Gastrointestinal: Negative for diarrhea, nausea and vomiting  Musculoskeletal: Negative for myalgias  Neurological: Negative for dizziness and headaches  Active Problem List     Patient Active Problem List   Diagnosis    Abdominal pain of multiple sites    Alternating constipation and diarrhea    Anemia    Anxiety    Back muscle spasm    Bipolar 1 disorder, mixed (HCC)    Bipolar I disorder, single manic episode ([facility])    Bloating    Breast pain    Cervical polyp    Chronic migraine without aura    [facility]    Female pelvic pain    Hyperlipidemia    Hypertension    Mammogram abnormal    Nephrolithiasis    Overactive bladder    Stress incontinence, female    Tobacco abuse    Vertigo    Excessive daytime sleepiness    Thyromegaly    Impaired fasting blood sugar         Social History:  Social History     Social History    Marital status: /Civil Union     Spouse name: N/A    Number of children: N/A    Years of education: N/A     Occupational History    Not on file       Social History Main Topics    Smoking status: Current Every Day Smoker     Packs/day: 0 50    Smokeless tobacco: Never Used      Comment: active smoker per allscripts    Alcohol use Yes      Comment: social, moderate per allscripts    Drug use: No    Sexual activity: Not on file     Other Topics Concern    Not on file     Social History Narrative    Caffeine use    Lives with adult children               Objective     Vitals:    12/18/18 1116 12/18/18 1138   BP: 130/100 130/84   BP Location: Left arm    Patient Position: Sitting    Cuff Size: Standard    Pulse: 72    Resp: (!) 24 14   Temp: 98 8 °F (37 1 °C)    SpO2: 94%    Weight: 71 3 kg (157 lb 2 oz)    Height: 5' 2" (1 575 m)      Wt Readings from Last 3 Encounters:   12/18/18 71 3 kg (157 lb 2 oz)   08/15/18 73 3 kg (161 lb 9 6 oz)   08/14/18 72 7 kg (160 lb 4 oz)       Physical Exam   Constitutional: She appears well-developed and well-nourished  HENT:   Head: Normocephalic and atraumatic  Right Ear: Tympanic membrane, external ear and ear canal normal    Left Ear: Tympanic membrane, external ear and ear canal normal    Nose: Mucosal edema (swollen b/l without erythema) present  Right sinus exhibits no maxillary sinus tenderness and no frontal sinus tenderness  Left sinus exhibits no maxillary sinus tenderness and no frontal sinus tenderness  Mouth/Throat: Oropharynx is clear and moist and mucous membranes are normal    Eyes: Pupils are equal, round, and reactive to light  Conjunctivae and lids are normal    Neck: Trachea normal and normal range of motion  Neck supple  No thyromegaly present  Cardiovascular: Normal rate, regular rhythm and normal heart sounds  No murmur heard  Pulses:       Radial pulses are 2+ on the right side, and 2+ on the left side  Pulmonary/Chest: Effort normal and breath sounds normal  She has no decreased breath sounds  She has no wheezes  She has no rhonchi  She has no rales  Lymphadenopathy:     She has no cervical adenopathy  Neurological: She is alert  Skin: No rash noted  Psychiatric: She has a normal mood and affect  Vitals reviewed        Pertinent Laboratory/Diagnostic Studies:  Results for orders placed or performed during the hospital encounter of 12/08/18   Urine culture   Result Value Ref Range    Urine Culture 30,000-39,000 cfu/ml     CBC and differential   Result Value Ref Range    WBC 6 71 4 31 - 10 16 Thousand/uL    RBC 5 08 3 81 - 5 12 Million/uL    Hemoglobin 15 5 (H) 11 5 - 15 4 g/dL    Hematocrit 47 0 (H) 34 8 - 46 1 %    MCV 93 82 - 98 fL    MCH 30 5 26 8 - 34 3 pg    MCHC 33 0 31 4 - 37 4 g/dL    RDW 12 0 11 6 - 15 1 % MPV 9 8 8 9 - 12 7 fL    Platelets 793 872 - 407 Thousands/uL    nRBC 0 /100 WBCs    Neutrophils Relative 37 (L) 43 - 75 %    Immat GRANS % 0 0 - 2 %    Lymphocytes Relative 53 (H) 14 - 44 %    Monocytes Relative 8 4 - 12 %    Eosinophils Relative 1 0 - 6 %    Basophils Relative 1 0 - 1 %    Neutrophils Absolute 2 45 1 85 - 7 62 Thousands/µL    Immature Grans Absolute 0 01 0 00 - 0 20 Thousand/uL    Lymphocytes Absolute 3 58 0 60 - 4 47 Thousands/µL    Monocytes Absolute 0 52 0 17 - 1 22 Thousand/µL    Eosinophils Absolute 0 09 0 00 - 0 61 Thousand/µL    Basophils Absolute 0 06 0 00 - 0 10 Thousands/µL   Comprehensive metabolic panel   Result Value Ref Range    Sodium 140 136 - 145 mmol/L    Potassium 3 6 3 5 - 5 3 mmol/L    Chloride 101 100 - 108 mmol/L    CO2 32 21 - 32 mmol/L    ANION GAP 7 4 - 13 mmol/L    BUN 16 5 - 25 mg/dL    Creatinine 0 97 0 60 - 1 30 mg/dL    Glucose 112 65 - 140 mg/dL    Calcium 9 0 8 3 - 10 1 mg/dL    AST 17 5 - 45 U/L    ALT 22 12 - 78 U/L    Alkaline Phosphatase 83 46 - 116 U/L    Total Protein 7 7 6 4 - 8 2 g/dL    Albumin 3 7 3 5 - 5 0 g/dL    Total Bilirubin 0 26 0 20 - 1 00 mg/dL    eGFR 68 ml/min/1 73sq m   Urine Microscopic   Result Value Ref Range    RBC, UA 0-1 (A) None Seen, 0-5 /hpf    WBC, UA 10-20 (A) None Seen, 0-5, 5-55, 5-65 /hpf    Epithelial Cells Occasional None Seen, Occasional /hpf    Bacteria, UA Moderate (A) None Seen, Occasional /hpf   ED Urine Macroscopic   Result Value Ref Range    Color, UA Yellow     Clarity, UA Clear     pH, UA 5 5 4 5 - 8 0    Leukocytes, UA Moderate (A) Negative    Nitrite, UA Negative Negative    Protein, UA Negative Negative mg/dl    Glucose, UA Negative Negative mg/dl    Ketones, UA Negative Negative mg/dl    Urobilinogen, UA 0 2 0 2, 1 0 E U /dl E U /dl    Bilirubin, UA Negative Negative    Blood, UA Negative Negative    Specific Gravity, UA 1 020 1 003 - 1 030       Orders Placed This Encounter   Procedures    Comprehensive metabolic panel    Lipid Panel with Direct LDL reflex    Hemoglobin A1C       ALLERGIES:  Allergies   Allergen Reactions    Codeine GI Intolerance    Erythromycin GI Intolerance    Percolone [Oxycodone] GI Intolerance    Topiramate Other (See Comments)     vomitting       Current Medications     Current Outpatient Prescriptions   Medication Sig Dispense Refill    atorvastatin (LIPITOR) 20 mg tablet Take 1 tablet (20 mg total) by mouth daily 30 tablet 3    carBAMazepine (TEGretol) 200 mg tablet Take 200 mg by mouth daily        clonazePAM (KlonoPIN) 2 mg tablet Take 2 mg by mouth      lamoTRIgine (LaMICtal) 200 MG tablet Take 400 mg by mouth daily at bedtime        meclizine (ANTIVERT) 25 mg tablet Take 1 tablet by mouth every 8 (eight) hours as needed for dizziness       metoprolol succinate (TOPROL-XL) 100 mg 24 hr tablet Take 1 tablet (100 mg total) by mouth daily 30 tablet 3    sulfamethoxazole-trimethoprim (BACTRIM DS) 800-160 mg per tablet Take 1 tablet by mouth 2 (two) times a day for 14 days smx-tmp DS (BACTRIM) 800-160 mg tabs (1tab q12 D10) 14 tablet 0    traZODone (DESYREL) 300 MG tablet Take 300 mg by mouth daily at bedtime        lidocaine (LIDODERM) 5 % Apply 1 patch topically daily Remove & Discard patch within 12 hours or as directed by MD (Patient not taking: Reported on 12/18/2018 ) 30 patch 0    oseltamivir (TAMIFLU) 75 mg capsule Take 1 capsule (75 mg total) by mouth daily for 10 days 10 capsule 0     No current facility-administered medications for this visit            Candie Barnett PA-C  12/18/2018 12:29 PM  [facility]

## 2018-12-18 NOTE — ASSESSMENT & PLAN NOTE
Patient is currently taking atorvastatin 20 mg daily  She was given a slip to recheck lipid panel prior to next visit in about 2 months

## 2018-12-18 NOTE — ASSESSMENT & PLAN NOTE
We reviewed that her blood pressure was initially elevated but did improve by the end of her visit  She notes that she was getting some elevated readings last week but was experiencing pain from kidney stone at that time  She was encouraged to continue to monitor her blood pressure at home  If she finds that she has any persistent elevations, she was encouraged to give us a call  Otherwise she was directed to follow up in about 2 months for recheck on her blood pressure with blood work as ordered today prior to that visit  She will continue for now with her metoprolol 100 mg daily

## 2019-03-11 DIAGNOSIS — E78.5 HYPERLIPIDEMIA, UNSPECIFIED HYPERLIPIDEMIA TYPE: ICD-10-CM

## 2019-03-11 DIAGNOSIS — I10 ESSENTIAL HYPERTENSION: ICD-10-CM

## 2019-03-11 RX ORDER — METOPROLOL SUCCINATE 100 MG/1
100 TABLET, EXTENDED RELEASE ORAL DAILY
Qty: 30 TABLET | Refills: 0 | Status: SHIPPED | OUTPATIENT
Start: 2019-03-11 | End: 2019-04-01 | Stop reason: SDUPTHER

## 2019-03-11 RX ORDER — ATORVASTATIN CALCIUM 20 MG/1
20 TABLET, FILM COATED ORAL DAILY
Qty: 30 TABLET | Refills: 0 | Status: SHIPPED | OUTPATIENT
Start: 2019-03-11 | End: 2019-03-29 | Stop reason: DRUGHIGH

## 2019-03-27 ENCOUNTER — APPOINTMENT (OUTPATIENT)
Dept: LAB | Age: 54
End: 2019-03-27
Payer: COMMERCIAL

## 2019-03-27 DIAGNOSIS — E78.5 HYPERLIPIDEMIA, UNSPECIFIED HYPERLIPIDEMIA TYPE: ICD-10-CM

## 2019-03-27 DIAGNOSIS — R73.01 IMPAIRED FASTING BLOOD SUGAR: ICD-10-CM

## 2019-03-27 LAB
ALBUMIN SERPL BCP-MCNC: 3.8 G/DL (ref 3.5–5)
ALP SERPL-CCNC: 83 U/L (ref 46–116)
ALT SERPL W P-5'-P-CCNC: 22 U/L (ref 12–78)
ANION GAP SERPL CALCULATED.3IONS-SCNC: 2 MMOL/L (ref 4–13)
AST SERPL W P-5'-P-CCNC: 17 U/L (ref 5–45)
BILIRUB SERPL-MCNC: 0.37 MG/DL (ref 0.2–1)
BUN SERPL-MCNC: 20 MG/DL (ref 5–25)
CALCIUM SERPL-MCNC: 8.6 MG/DL (ref 8.3–10.1)
CHLORIDE SERPL-SCNC: 104 MMOL/L (ref 100–108)
CHOLEST SERPL-MCNC: 208 MG/DL (ref 50–200)
CO2 SERPL-SCNC: 33 MMOL/L (ref 21–32)
CREAT SERPL-MCNC: 1 MG/DL (ref 0.6–1.3)
EST. AVERAGE GLUCOSE BLD GHB EST-MCNC: 105 MG/DL
GFR SERPL CREATININE-BSD FRML MDRD: 65 ML/MIN/1.73SQ M
GLUCOSE P FAST SERPL-MCNC: 115 MG/DL (ref 65–99)
HBA1C MFR BLD: 5.3 % (ref 4.2–6.3)
HDLC SERPL-MCNC: 37 MG/DL (ref 40–60)
LDLC SERPL DIRECT ASSAY-MCNC: 79 MG/DL (ref 0–100)
POTASSIUM SERPL-SCNC: 4 MMOL/L (ref 3.5–5.3)
PROT SERPL-MCNC: 7.7 G/DL (ref 6.4–8.2)
SODIUM SERPL-SCNC: 139 MMOL/L (ref 136–145)
TRIGL SERPL-MCNC: 594 MG/DL

## 2019-03-27 PROCEDURE — 80053 COMPREHEN METABOLIC PANEL: CPT | Performed by: PHYSICIAN ASSISTANT

## 2019-03-27 PROCEDURE — 83036 HEMOGLOBIN GLYCOSYLATED A1C: CPT

## 2019-03-27 PROCEDURE — 83721 ASSAY OF BLOOD LIPOPROTEIN: CPT

## 2019-03-27 PROCEDURE — 36415 COLL VENOUS BLD VENIPUNCTURE: CPT | Performed by: PHYSICIAN ASSISTANT

## 2019-03-27 PROCEDURE — 80061 LIPID PANEL: CPT

## 2019-03-29 ENCOUNTER — TELEPHONE (OUTPATIENT)
Dept: FAMILY MEDICINE CLINIC | Facility: CLINIC | Age: 54
End: 2019-03-29

## 2019-03-29 DIAGNOSIS — E78.2 MIXED HYPERLIPIDEMIA: Primary | ICD-10-CM

## 2019-03-29 RX ORDER — ATORVASTATIN CALCIUM 40 MG/1
40 TABLET, FILM COATED ORAL DAILY
Qty: 30 TABLET | Refills: 0
Start: 2019-03-29 | End: 2019-04-01 | Stop reason: SDUPTHER

## 2019-03-29 NOTE — TELEPHONE ENCOUNTER
----- Message from Toshia Miller PA-C sent at 3/27/2019  1:18 PM EDT -----  Please tell the patient that her blood work showed he normal A1c as well as improved cholesterol readings  Her triglyceride portion of the cholesterol panel though is extremely high  Please urged her to limit her intake of fatty food and try to get regular exercise  I would recommend also increasing her atorvastatin to 40 mg daily to try to lower her triglyceride level  We can discuss further at her upcoming appointment      Please tell the patient that her blood work showed an elevated fasting blood sugar   Her A1c on separately reported labs though was normal   She should be careful to limit her intake of carbohydrates

## 2019-03-29 NOTE — TELEPHONE ENCOUNTER
Labs were review with patient  Pt will increase her atorvastatin to 40 mg daily    Pt still has Atorvasting 20mg at home she will start taking 2 tab and she will request a refill on Monday 04/01/2019 during her OV at 9:20 am

## 2019-04-01 ENCOUNTER — OFFICE VISIT (OUTPATIENT)
Dept: FAMILY MEDICINE CLINIC | Facility: CLINIC | Age: 54
End: 2019-04-01
Payer: COMMERCIAL

## 2019-04-01 VITALS
WEIGHT: 160.25 LBS | DIASTOLIC BLOOD PRESSURE: 92 MMHG | TEMPERATURE: 98.5 F | BODY MASS INDEX: 29.49 KG/M2 | SYSTOLIC BLOOD PRESSURE: 138 MMHG | OXYGEN SATURATION: 94 % | HEART RATE: 62 BPM | HEIGHT: 62 IN

## 2019-04-01 DIAGNOSIS — Z72.0 TOBACCO ABUSE: ICD-10-CM

## 2019-04-01 DIAGNOSIS — R73.01 IMPAIRED FASTING BLOOD SUGAR: ICD-10-CM

## 2019-04-01 DIAGNOSIS — Z12.31 ENCOUNTER FOR SCREENING MAMMOGRAM FOR BREAST CANCER: ICD-10-CM

## 2019-04-01 DIAGNOSIS — I10 ESSENTIAL HYPERTENSION: Primary | ICD-10-CM

## 2019-04-01 DIAGNOSIS — E78.2 MIXED HYPERLIPIDEMIA: ICD-10-CM

## 2019-04-01 PROCEDURE — 3008F BODY MASS INDEX DOCD: CPT | Performed by: PHYSICIAN ASSISTANT

## 2019-04-01 PROCEDURE — 99214 OFFICE O/P EST MOD 30 MIN: CPT | Performed by: PHYSICIAN ASSISTANT

## 2019-04-01 RX ORDER — NICOTINE 21 MG/24HR
1 PATCH, TRANSDERMAL 24 HOURS TRANSDERMAL EVERY 24 HOURS
Qty: 28 PATCH | Refills: 1 | Status: SHIPPED | OUTPATIENT
Start: 2019-04-01 | End: 2019-09-24

## 2019-04-01 RX ORDER — METOPROLOL SUCCINATE 100 MG/1
100 TABLET, EXTENDED RELEASE ORAL DAILY
Qty: 90 TABLET | Refills: 1 | Status: SHIPPED | OUTPATIENT
Start: 2019-04-01 | End: 2019-09-24 | Stop reason: SDUPTHER

## 2019-04-01 RX ORDER — AMLODIPINE BESYLATE 2.5 MG/1
2.5 TABLET ORAL DAILY
Qty: 90 TABLET | Refills: 1 | Status: SHIPPED | OUTPATIENT
Start: 2019-04-01 | End: 2019-09-24 | Stop reason: SDUPTHER

## 2019-04-01 RX ORDER — ATORVASTATIN CALCIUM 40 MG/1
40 TABLET, FILM COATED ORAL DAILY
Qty: 90 TABLET | Refills: 1 | Status: SHIPPED | OUTPATIENT
Start: 2019-04-01 | End: 2019-09-24 | Stop reason: SDUPTHER

## 2019-05-16 ENCOUNTER — TRANSITIONAL CARE MANAGEMENT (OUTPATIENT)
Dept: FAMILY MEDICINE CLINIC | Facility: CLINIC | Age: 54
End: 2019-05-16

## 2019-05-21 ENCOUNTER — OFFICE VISIT (OUTPATIENT)
Dept: FAMILY MEDICINE CLINIC | Facility: CLINIC | Age: 54
End: 2019-05-21
Payer: COMMERCIAL

## 2019-05-21 VITALS
DIASTOLIC BLOOD PRESSURE: 80 MMHG | HEIGHT: 62 IN | OXYGEN SATURATION: 96 % | SYSTOLIC BLOOD PRESSURE: 110 MMHG | WEIGHT: 157.38 LBS | HEART RATE: 64 BPM | BODY MASS INDEX: 28.96 KG/M2

## 2019-05-21 DIAGNOSIS — F41.9 ANXIETY: ICD-10-CM

## 2019-05-21 DIAGNOSIS — I10 ESSENTIAL HYPERTENSION: Primary | ICD-10-CM

## 2019-05-21 DIAGNOSIS — F31.60 BIPOLAR 1 DISORDER, MIXED (HCC): ICD-10-CM

## 2019-05-21 DIAGNOSIS — E66.3 OVERWEIGHT (BMI 25.0-29.9): ICD-10-CM

## 2019-05-21 DIAGNOSIS — Z72.0 TOBACCO ABUSE: ICD-10-CM

## 2019-05-21 DIAGNOSIS — Z23 NEED FOR 23-POLYVALENT PNEUMOCOCCAL POLYSACCHARIDE VACCINE: ICD-10-CM

## 2019-05-21 DIAGNOSIS — E78.5 HYPERLIPIDEMIA, UNSPECIFIED HYPERLIPIDEMIA TYPE: ICD-10-CM

## 2019-05-21 PROCEDURE — 99495 TRANSJ CARE MGMT MOD F2F 14D: CPT | Performed by: PHYSICIAN ASSISTANT

## 2019-05-21 PROCEDURE — 90732 PPSV23 VACC 2 YRS+ SUBQ/IM: CPT

## 2019-05-21 PROCEDURE — 90471 IMMUNIZATION ADMIN: CPT

## 2019-05-29 ENCOUNTER — TELEPHONE (OUTPATIENT)
Dept: FAMILY MEDICINE CLINIC | Facility: CLINIC | Age: 54
End: 2019-05-29

## 2019-08-14 ENCOUNTER — APPOINTMENT (OUTPATIENT)
Dept: LAB | Age: 54
End: 2019-08-14
Payer: COMMERCIAL

## 2019-08-14 ENCOUNTER — TRANSCRIBE ORDERS (OUTPATIENT)
Dept: ADMINISTRATIVE | Facility: HOSPITAL | Age: 54
End: 2019-08-14

## 2019-08-14 DIAGNOSIS — I10 ESSENTIAL HYPERTENSION: ICD-10-CM

## 2019-08-14 DIAGNOSIS — R73.01 IMPAIRED FASTING BLOOD SUGAR: ICD-10-CM

## 2019-08-14 DIAGNOSIS — E78.2 MIXED HYPERLIPIDEMIA: ICD-10-CM

## 2019-08-14 DIAGNOSIS — Z79.899 ENCOUNTER FOR LONG-TERM (CURRENT) USE OF OTHER MEDICATIONS: ICD-10-CM

## 2019-08-14 DIAGNOSIS — Z79.899 ENCOUNTER FOR LONG-TERM (CURRENT) USE OF OTHER MEDICATIONS: Primary | ICD-10-CM

## 2019-08-14 LAB
ALBUMIN SERPL BCP-MCNC: 3.6 G/DL (ref 3.5–5)
ALP SERPL-CCNC: 82 U/L (ref 46–116)
ALT SERPL W P-5'-P-CCNC: 26 U/L (ref 12–78)
ANION GAP SERPL CALCULATED.3IONS-SCNC: 3 MMOL/L (ref 4–13)
AST SERPL W P-5'-P-CCNC: 16 U/L (ref 5–45)
BASOPHILS # BLD AUTO: 0.06 THOUSANDS/ΜL (ref 0–0.1)
BASOPHILS NFR BLD AUTO: 1 % (ref 0–1)
BILIRUB DIRECT SERPL-MCNC: 0.12 MG/DL (ref 0–0.2)
BILIRUB SERPL-MCNC: 0.36 MG/DL (ref 0.2–1)
BUN SERPL-MCNC: 8 MG/DL (ref 5–25)
CALCIUM SERPL-MCNC: 8.9 MG/DL (ref 8.3–10.1)
CARBAMAZEPINE SERPL-MCNC: 5.5 UG/ML (ref 4–12)
CHLORIDE SERPL-SCNC: 105 MMOL/L (ref 100–108)
CHOLEST SERPL-MCNC: 168 MG/DL (ref 50–200)
CO2 SERPL-SCNC: 32 MMOL/L (ref 21–32)
CREAT SERPL-MCNC: 0.82 MG/DL (ref 0.6–1.3)
EOSINOPHIL # BLD AUTO: 0.1 THOUSAND/ΜL (ref 0–0.61)
EOSINOPHIL NFR BLD AUTO: 2 % (ref 0–6)
ERYTHROCYTE [DISTWIDTH] IN BLOOD BY AUTOMATED COUNT: 12.2 % (ref 11.6–15.1)
GFR SERPL CREATININE-BSD FRML MDRD: 83 ML/MIN/1.73SQ M
GLUCOSE P FAST SERPL-MCNC: 109 MG/DL (ref 65–99)
HCT VFR BLD AUTO: 48 % (ref 34.8–46.1)
HDLC SERPL-MCNC: 50 MG/DL (ref 40–60)
HGB BLD-MCNC: 15.6 G/DL (ref 11.5–15.4)
IMM GRANULOCYTES # BLD AUTO: 0 THOUSAND/UL (ref 0–0.2)
IMM GRANULOCYTES NFR BLD AUTO: 0 % (ref 0–2)
LDLC SERPL CALC-MCNC: 71 MG/DL (ref 0–100)
LYMPHOCYTES # BLD AUTO: 2.43 THOUSANDS/ΜL (ref 0.6–4.47)
LYMPHOCYTES NFR BLD AUTO: 47 % (ref 14–44)
MCH RBC QN AUTO: 30.2 PG (ref 26.8–34.3)
MCHC RBC AUTO-ENTMCNC: 32.5 G/DL (ref 31.4–37.4)
MCV RBC AUTO: 93 FL (ref 82–98)
MONOCYTES # BLD AUTO: 0.42 THOUSAND/ΜL (ref 0.17–1.22)
MONOCYTES NFR BLD AUTO: 8 % (ref 4–12)
NEUTROPHILS # BLD AUTO: 2.15 THOUSANDS/ΜL (ref 1.85–7.62)
NEUTS SEG NFR BLD AUTO: 42 % (ref 43–75)
NRBC BLD AUTO-RTO: 0 /100 WBCS
PLATELET # BLD AUTO: 238 THOUSANDS/UL (ref 149–390)
PMV BLD AUTO: 10.2 FL (ref 8.9–12.7)
POTASSIUM SERPL-SCNC: 3.8 MMOL/L (ref 3.5–5.3)
PROT SERPL-MCNC: 7.5 G/DL (ref 6.4–8.2)
RBC # BLD AUTO: 5.17 MILLION/UL (ref 3.81–5.12)
SODIUM SERPL-SCNC: 140 MMOL/L (ref 136–145)
TRIGL SERPL-MCNC: 235 MG/DL
WBC # BLD AUTO: 5.16 THOUSAND/UL (ref 4.31–10.16)

## 2019-08-14 PROCEDURE — 80156 ASSAY CARBAMAZEPINE TOTAL: CPT

## 2019-08-14 PROCEDURE — 36415 COLL VENOUS BLD VENIPUNCTURE: CPT

## 2019-08-14 PROCEDURE — 80061 LIPID PANEL: CPT

## 2019-08-14 PROCEDURE — 85025 COMPLETE CBC W/AUTO DIFF WBC: CPT

## 2019-08-14 PROCEDURE — 82248 BILIRUBIN DIRECT: CPT

## 2019-08-14 PROCEDURE — 80053 COMPREHEN METABOLIC PANEL: CPT

## 2019-09-12 PROBLEM — S62.002A: Status: ACTIVE | Noted: 2019-06-05

## 2019-09-12 PROBLEM — S62.175D: Status: ACTIVE | Noted: 2019-06-17

## 2019-09-12 PROBLEM — S52.122A CLOSED DISPLACED FRACTURE OF HEAD OF LEFT RADIUS: Status: ACTIVE | Noted: 2019-06-05

## 2019-09-12 PROBLEM — S63.8X2A TEAR OF LEFT SCAPHOLUNATE LIGAMENT: Status: ACTIVE | Noted: 2019-07-18
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
## 2019-09-24 ENCOUNTER — OFFICE VISIT (OUTPATIENT)
Dept: FAMILY MEDICINE CLINIC | Facility: CLINIC | Age: 54
End: 2019-09-24
Payer: COMMERCIAL

## 2019-09-24 VITALS
DIASTOLIC BLOOD PRESSURE: 86 MMHG | BODY MASS INDEX: 29.08 KG/M2 | WEIGHT: 159 LBS | TEMPERATURE: 99.4 F | SYSTOLIC BLOOD PRESSURE: 118 MMHG | HEART RATE: 76 BPM | OXYGEN SATURATION: 94 %

## 2019-09-24 DIAGNOSIS — Z72.0 TOBACCO ABUSE: ICD-10-CM

## 2019-09-24 DIAGNOSIS — Z80.3 FAMILY HISTORY OF BREAST CANCER IN SISTER: ICD-10-CM

## 2019-09-24 DIAGNOSIS — R73.03 PREDIABETES: ICD-10-CM

## 2019-09-24 DIAGNOSIS — Z12.31 BREAST CANCER SCREENING BY MAMMOGRAM: ICD-10-CM

## 2019-09-24 DIAGNOSIS — Z80.3 FAMILY HISTORY OF BREAST CANCER IN MOTHER: ICD-10-CM

## 2019-09-24 DIAGNOSIS — T07.XXXA MULTIPLE FRACTURES: ICD-10-CM

## 2019-09-24 DIAGNOSIS — Z23 NEED FOR INFLUENZA VACCINATION: ICD-10-CM

## 2019-09-24 DIAGNOSIS — E78.2 MIXED HYPERLIPIDEMIA: ICD-10-CM

## 2019-09-24 DIAGNOSIS — E66.3 OVERWEIGHT (BMI 25.0-29.9): ICD-10-CM

## 2019-09-24 DIAGNOSIS — I10 ESSENTIAL HYPERTENSION: Primary | ICD-10-CM

## 2019-09-24 LAB — SL AMB POCT HEMOGLOBIN AIC: 5.9 (ref ?–6.5)

## 2019-09-24 PROCEDURE — 90682 RIV4 VACC RECOMBINANT DNA IM: CPT | Performed by: PHYSICIAN ASSISTANT

## 2019-09-24 PROCEDURE — 3079F DIAST BP 80-89 MM HG: CPT | Performed by: PHYSICIAN ASSISTANT

## 2019-09-24 PROCEDURE — 83036 HEMOGLOBIN GLYCOSYLATED A1C: CPT | Performed by: PHYSICIAN ASSISTANT

## 2019-09-24 PROCEDURE — 90471 IMMUNIZATION ADMIN: CPT | Performed by: PHYSICIAN ASSISTANT

## 2019-09-24 PROCEDURE — 99214 OFFICE O/P EST MOD 30 MIN: CPT | Performed by: PHYSICIAN ASSISTANT

## 2019-09-24 PROCEDURE — 3074F SYST BP LT 130 MM HG: CPT | Performed by: PHYSICIAN ASSISTANT

## 2019-09-24 RX ORDER — AMLODIPINE BESYLATE 2.5 MG/1
2.5 TABLET ORAL DAILY
Qty: 90 TABLET | Refills: 1 | Status: SHIPPED | OUTPATIENT
Start: 2019-09-24 | End: 2020-03-23 | Stop reason: SDUPTHER

## 2019-09-24 RX ORDER — TRAZODONE HYDROCHLORIDE 150 MG/1
150 TABLET ORAL
COMMUNITY

## 2019-09-24 RX ORDER — METOPROLOL SUCCINATE 100 MG/1
100 TABLET, EXTENDED RELEASE ORAL DAILY
Qty: 90 TABLET | Refills: 1 | Status: SHIPPED | OUTPATIENT
Start: 2019-09-24 | End: 2020-04-06 | Stop reason: SDUPTHER

## 2019-09-24 RX ORDER — GABAPENTIN 100 MG/1
100 CAPSULE ORAL 3 TIMES DAILY
COMMUNITY
End: 2019-11-18

## 2019-09-24 RX ORDER — ATORVASTATIN CALCIUM 40 MG/1
40 TABLET, FILM COATED ORAL DAILY
Qty: 90 TABLET | Refills: 1 | Status: SHIPPED | OUTPATIENT
Start: 2019-09-24 | End: 2020-04-06 | Stop reason: SDUPTHER

## 2019-09-24 NOTE — ASSESSMENT & PLAN NOTE
Encouraged to work on eating a healthy diet, especially increasing intake of fruits and vegetables as well as exercise and on a regular basis  BMI Counseling: Body mass index is 29 08 kg/m²  The BMI is above normal  Nutrition recommendations include 3-5 servings of fruits/vegetables daily  Exercise recommendations include exercising 3-5 times per week

## 2019-09-24 NOTE — ASSESSMENT & PLAN NOTE
Encouraged to quit smoking  She states that she has states that she wants to quit smoking and other days that she does not feel like she is ready to  She was reminded that she has a script for nicotine 14 mg patches  She was encouraged to try these on a day when she felt that desire to not smoke and see if she can then continue moving forward after that day  She declined referral to smoking cessation

## 2019-09-24 NOTE — ASSESSMENT & PLAN NOTE
Patient has multiple fractures which have occurred without any significant trauma or falls from any elevations  Will check DEXA scan to assess for osteoporosis

## 2019-09-24 NOTE — ASSESSMENT & PLAN NOTE
Controlled  Continue with amlodipine 2 5 mg daily and metoprolol 100 mg daily  Will continue to monitor

## 2019-09-24 NOTE — ASSESSMENT & PLAN NOTE
We reviewed that her A1c today was 5 9%  This is increased since earlier this year  She was encouraged to limit her intake of carbohydrates  Will recheck again prior to her next visit in 6 months with labs as provided today

## 2019-11-18 ENCOUNTER — OFFICE VISIT (OUTPATIENT)
Dept: FAMILY MEDICINE CLINIC | Facility: CLINIC | Age: 54
End: 2019-11-18
Payer: COMMERCIAL

## 2019-11-18 VITALS
WEIGHT: 157.8 LBS | SYSTOLIC BLOOD PRESSURE: 118 MMHG | TEMPERATURE: 99 F | BODY MASS INDEX: 28.86 KG/M2 | OXYGEN SATURATION: 94 % | HEART RATE: 76 BPM | DIASTOLIC BLOOD PRESSURE: 84 MMHG

## 2019-11-18 DIAGNOSIS — F30.9 BIPOLAR I DISORDER, SINGLE MANIC EPISODE (HCC): ICD-10-CM

## 2019-11-18 DIAGNOSIS — J02.9 PHARYNGITIS, UNSPECIFIED ETIOLOGY: Primary | ICD-10-CM

## 2019-11-18 PROBLEM — S62.175D: Status: RESOLVED | Noted: 2019-06-17 | Resolved: 2019-11-18

## 2019-11-18 PROBLEM — S52.122A CLOSED DISPLACED FRACTURE OF HEAD OF LEFT RADIUS: Status: RESOLVED | Noted: 2019-06-05 | Resolved: 2019-11-18

## 2019-11-18 PROBLEM — T07.XXXA MULTIPLE FRACTURES: Status: RESOLVED | Noted: 2019-09-24 | Resolved: 2019-11-18

## 2019-11-18 LAB — S PYO AG THROAT QL: NEGATIVE

## 2019-11-18 PROCEDURE — 87147 CULTURE TYPE IMMUNOLOGIC: CPT | Performed by: FAMILY MEDICINE

## 2019-11-18 PROCEDURE — 99213 OFFICE O/P EST LOW 20 MIN: CPT | Performed by: FAMILY MEDICINE

## 2019-11-18 PROCEDURE — 4004F PT TOBACCO SCREEN RCVD TLK: CPT | Performed by: FAMILY MEDICINE

## 2019-11-18 PROCEDURE — 87070 CULTURE OTHR SPECIMN AEROBIC: CPT | Performed by: FAMILY MEDICINE

## 2019-11-18 PROCEDURE — 87880 STREP A ASSAY W/OPTIC: CPT | Performed by: FAMILY MEDICINE

## 2019-11-18 RX ORDER — PREDNISONE 20 MG/1
20 TABLET ORAL DAILY
Qty: 3 TABLET | Refills: 0 | Status: SHIPPED | OUTPATIENT
Start: 2019-11-18 | End: 2020-02-11

## 2019-11-18 NOTE — PROGRESS NOTES
Assessment/Plan:       Problem List Items Addressed This Visit        Other    Bipolar I disorder, single manic episode ([address] )     Continue close follow-up with Psychiatry  I am placing her on prednisone for 3 days to decrease her tonsillar hypertrophy and pain  She will monitor closely for any increased symptoms of agitation or mood changes  Other Visit Diagnoses     Pharyngitis, unspecified etiology    -  Primary    Rapid strep negative  Check culture  I gave her a few days of steroids due to the significant swelling  Relevant Medications    predniSONE 20 mg tablet    Other Relevant Orders    POCT rapid strepA (Completed)    Throat culture            Subjective:      Patient ID: Della Yates is a 48 y o  female  HPI patient presents today with a four-day history of worsening sore throat  She has had a mild intermittent cough  She has been trying Mucinex without much relief  She denies any excessively high fevers, but has had some intermittent chills  Sore throat is much worse when she swallows  She has history of bipolar disorder which is followed by Psychiatry and thankfully stable  She does score high on her PHQ-9 at 16, but feels her overall mood is stable      The following portions of the patient's history were reviewed and updated as appropriate: allergies, current medications, past family history, past medical history, past social history, past surgical history and problem list       Current Outpatient Medications:     amLODIPine (NORVASC) 2 5 mg tablet, Take 1 tablet (2 5 mg total) by mouth daily, Disp: 90 tablet, Rfl: 1    atorvastatin (LIPITOR) 40 mg tablet, Take 1 tablet (40 mg total) by mouth daily, Disp: 90 tablet, Rfl: 1    carBAMazepine (TEGretol) 200 mg tablet, Take 200 mg by mouth daily  , Disp: , Rfl:     clonazePAM (KlonoPIN) 2 mg tablet, Take 2 mg by mouth, Disp: , Rfl:     metoprolol succinate (TOPROL-XL) 100 mg 24 hr tablet, Take 1 tablet (100 mg total) by mouth daily, Disp: 90 tablet, Rfl: 1    traZODone (DESYREL) 150 mg tablet, Take 150 mg by mouth daily at bedtime, Disp: , Rfl:     predniSONE 20 mg tablet, Take 1 tablet (20 mg total) by mouth daily, Disp: 3 tablet, Rfl: 0     Review of Systems   Constitutional: Negative for appetite change, chills, fatigue, fever and unexpected weight change  HENT: Positive for congestion and sore throat  Negative for mouth sores, nosebleeds, postnasal drip, rhinorrhea, sinus pressure, sinus pain and trouble swallowing  Eyes: Negative for visual disturbance  Respiratory: Positive for cough  Negative for chest tightness, shortness of breath and wheezing  Cardiovascular: Negative for chest pain  Gastrointestinal: Negative for abdominal distention, abdominal pain, blood in stool, constipation and diarrhea  Endocrine: Negative for polyuria  Genitourinary: Negative for difficulty urinating and flank pain  Musculoskeletal: Negative for arthralgias and myalgias  Skin: Negative for rash  Neurological: Negative for dizziness and light-headedness  Hematological: Negative for adenopathy  Does not bruise/bleed easily  Psychiatric/Behavioral: Negative for sleep disturbance  Objective:      /84 (BP Location: Left arm, Patient Position: Sitting, Cuff Size: Standard)   Pulse 76   Temp 99 °F (37 2 °C) (Tympanic)   Wt 71 6 kg (157 lb 12 8 oz)   SpO2 94%   BMI 28 86 kg/m²          Physical Exam   Constitutional: She is oriented to person, place, and time  She appears well-developed and well-nourished  No distress  HENT:   Head: Normocephalic  Mouth/Throat: Oropharyngeal exudate (She has tonsillar hypertrophy as well as exudates, in particular on her left tonsil ) present  Eyes: Pupils are equal, round, and reactive to light  Right eye exhibits no discharge  Left eye exhibits no discharge  Neck: No tracheal deviation present  No thyromegaly present     Cardiovascular: Normal rate, regular rhythm and normal heart sounds  No murmur heard  Pulmonary/Chest: Effort normal  No respiratory distress  She has no wheezes  She has no rales  Abdominal: Soft  She exhibits no distension  There is no tenderness  Musculoskeletal: Normal range of motion  She exhibits no edema  Lymphadenopathy:     She has no cervical adenopathy  Neurological: She is alert and oriented to person, place, and time  No cranial nerve deficit  Skin: Skin is warm  She is not diaphoretic  No erythema  Psychiatric: She has a normal mood and affect   Judgment and thought content normal          Tabitha Santos MD

## 2019-11-18 NOTE — ASSESSMENT & PLAN NOTE
Continue close follow-up with Psychiatry  I am placing her on prednisone for 3 days to decrease her tonsillar hypertrophy and pain  She will monitor closely for any increased symptoms of agitation or mood changes

## 2019-11-20 DIAGNOSIS — J02.0 STREP PHARYNGITIS: Primary | ICD-10-CM

## 2019-11-20 LAB — BACTERIA THROAT CULT: ABNORMAL

## 2019-11-20 RX ORDER — AMOXICILLIN 875 MG/1
875 TABLET, COATED ORAL 2 TIMES DAILY
Qty: 14 TABLET | Refills: 0
Start: 2019-11-20 | End: 2019-11-27

## 2019-11-20 NOTE — TELEPHONE ENCOUNTER
Notes recorded by Geovanny Freire RN on 11/20/2019 at 4:24 PM EST  Notified and rx called in to [facility]    ------    Notes recorded by Anita Gimenez MD on 11/20/2019 at 3:44 PM EST  Call patient regarding test  Sylvain Pablo has strep throat   Start amoxicillin 875 b i d  X7 days

## 2020-02-11 ENCOUNTER — OFFICE VISIT (OUTPATIENT)
Dept: FAMILY MEDICINE CLINIC | Facility: CLINIC | Age: 55
End: 2020-02-11
Payer: COMMERCIAL

## 2020-02-11 VITALS
WEIGHT: 156 LBS | RESPIRATION RATE: 18 BRPM | DIASTOLIC BLOOD PRESSURE: 80 MMHG | TEMPERATURE: 98.5 F | OXYGEN SATURATION: 97 % | HEIGHT: 62 IN | HEART RATE: 76 BPM | BODY MASS INDEX: 28.71 KG/M2 | SYSTOLIC BLOOD PRESSURE: 116 MMHG

## 2020-02-11 DIAGNOSIS — J02.9 SORE THROAT: Primary | ICD-10-CM

## 2020-02-11 LAB — S PYO AG THROAT QL: NEGATIVE

## 2020-02-11 PROCEDURE — 87070 CULTURE OTHR SPECIMN AEROBIC: CPT | Performed by: PHYSICIAN ASSISTANT

## 2020-02-11 PROCEDURE — 4004F PT TOBACCO SCREEN RCVD TLK: CPT | Performed by: PHYSICIAN ASSISTANT

## 2020-02-11 PROCEDURE — 3074F SYST BP LT 130 MM HG: CPT | Performed by: PHYSICIAN ASSISTANT

## 2020-02-11 PROCEDURE — 3079F DIAST BP 80-89 MM HG: CPT | Performed by: PHYSICIAN ASSISTANT

## 2020-02-11 PROCEDURE — 99213 OFFICE O/P EST LOW 20 MIN: CPT | Performed by: PHYSICIAN ASSISTANT

## 2020-02-11 PROCEDURE — 87880 STREP A ASSAY W/OPTIC: CPT | Performed by: PHYSICIAN ASSISTANT

## 2020-02-11 PROCEDURE — 3008F BODY MASS INDEX DOCD: CPT | Performed by: PHYSICIAN ASSISTANT

## 2020-02-11 PROCEDURE — 87147 CULTURE TYPE IMMUNOLOGIC: CPT | Performed by: PHYSICIAN ASSISTANT

## 2020-02-11 NOTE — PATIENT INSTRUCTIONS
Problem List Items Addressed This Visit     None      Visit Diagnoses     Sore throat    -  Primary    Reviewed negative rapid strep  Will send for culture  Likely viral pharyngitis  Increase fluids and rest  Call if worsens or persists       Relevant Orders    POCT rapid strepA (Completed)    Throat culture

## 2020-02-11 NOTE — PROGRESS NOTES
FAMILY PRACTICE ACUTE OFFICE VISIT  [facility] - [facility]       NAME: Ronda Yates  AGE: 48 y o  SEX: female       : 1965        MRN: [id number]    DATE: 2020  TIME: 2:00 PM    Assessment and Plan     Problem List Items Addressed This Visit     None      Visit Diagnoses     Sore throat    -  Primary    Reviewed negative rapid strep  Will send for culture  Likely viral pharyngitis  Increase fluids and rest  Call if worsens or persists  Relevant Orders    POCT rapid strepA (Completed)    Throat culture                  Chief Complaint     Chief Complaint   Patient presents with    Cold Like Symptoms     soret throat, coughing for 4 days        History of Present Illness   Janis Charlton is a 48y o -year-old female who presents for sore throat and cough  URI    This is a new (notes that nephew and sister have a cough for 3 weeks) problem  Episode onset: about 5 days ago  The problem has been gradually worsening  There has been no fever  Associated symptoms include coughing (sometimes dry and sometimes not - notes it is not often that she coughs though) and a sore throat (started scratchy)  Pertinent negatives include no congestion, diarrhea, ear pain, headaches, nausea, rhinorrhea, vomiting or wheezing  She has tried nothing for the symptoms  Review of Systems   Review of Systems   Constitutional: Positive for fatigue  Negative for chills and fever  HENT: Positive for sore throat (started scratchy) and voice change  Negative for congestion, ear pain, postnasal drip and rhinorrhea  Respiratory: Positive for cough (sometimes dry and sometimes not - notes it is not often that she coughs though)  Negative for chest tightness, shortness of breath and wheezing  Gastrointestinal: Negative for diarrhea, nausea and vomiting  Musculoskeletal:        Sore in ribs and hip bones   Neurological: Negative for headaches         Active Problem List     Patient Active Problem List   Diagnosis    Abdominal pain of multiple sites    Alternating constipation and diarrhea    Anemia    Anxiety    Back muscle spasm    Bipolar 1 disorder, mixed (HCC)    Bipolar I disorder, single manic episode ([facility])    Bloating    Breast pain    Cervical polyp    Chronic migraine without aura    Famil[facility] cancer [facility] breast ([facility] [address] [facility]    Female pelvic pain    Hyperlipidemia    Essential hypertension    Mammogram abnormal    Nephrolithiasis    Overactive bladder    Stress incontinence, female    Tobacco abuse    Vertigo    Excessive daytime sleepiness    Thyromegaly    Prediabetes    Overweight (BMI 25 0-29  9)    Tear of left scapholunate ligament    Occult closed fracture of scaphoid bone of left wrist         Social History:  Social History     Socioeconomic History    Marital status: /Civil Union     Spouse name: Not on file    Number of children: Not on file    Years of education: Not on file    Highest education level: Not on file   Occupational History    Not on file   Social Needs    Financial resource strain: Not on file    Food insecurity:     Worry: Not on file     Inability: Not on file    Transportation needs:     Medical: Not on file     Non-medical: Not on file   Tobacco Use    Smoking status: Current Every Day Smoker     Packs/day: 0 50    Smokeless tobacco: Never Used    Tobacco comment: active smoker per allscripts   Substance and Sexual Activity    Alcohol use: Yes     Frequency: Monthly or less     Comment: social, moderate per allscripts, special occasions    Drug use: No    Sexual activity: Yes     Partners: Male   Lifestyle    Physical activity:     Days per week: Not on file     Minutes per session: Not on file    Stress: Not on file   Relationships    Social connections:     Talks on phone: Not on file     Gets together: Not on file     Attends Confucianist service: Not on file     Active member of club or organization: Not on file     Attends meetings of clubs or organizations: Not on file     Relationship status: Not on file    Intimate partner violence:     Fear of current or ex partner: Not on file     Emotionally abused: Not on file     Physically abused: Not on file     Forced sexual activity: Not on file   Other Topics Concern    Not on file   Social History Narrative    Caffeine use    Lives with adult children           Objective     Vitals:    02/11/20 1308   BP: 116/80   BP Location: Left arm   Patient Position: Sitting   Cuff Size: Standard   Pulse: 76   Resp: 18   Temp: 98 5 °F (36 9 °C)   SpO2: 97%   Weight: 70 8 kg (156 lb)   Height: 5' 2" (1 575 m)     Wt Readings from Last 3 Encounters:   02/11/20 70 8 kg (156 lb)   11/18/19 71 6 kg (157 lb 12 8 oz)   09/24/19 72 1 kg (159 lb)       Physical Exam   Constitutional: She appears well-developed and well-nourished  No distress  HENT:   Head: Normocephalic and atraumatic  Right Ear: Tympanic membrane, external ear and ear canal normal    Left Ear: Tympanic membrane, external ear and ear canal normal    Nose: Nose normal  Right sinus exhibits no maxillary sinus tenderness and no frontal sinus tenderness  Left sinus exhibits no maxillary sinus tenderness and no frontal sinus tenderness  Mouth/Throat: Mucous membranes are normal  Posterior oropharyngeal erythema present  No oropharyngeal exudate or posterior oropharyngeal edema  Eyes: Pupils are equal, round, and reactive to light  Conjunctivae and lids are normal    Neck: Trachea normal and normal range of motion  Neck supple  No thyromegaly present  Cardiovascular: Normal rate, regular rhythm, normal heart sounds and intact distal pulses  No murmur heard  Pulses:       Radial pulses are 2+ on the right side, and 2+ on the left side  Pulmonary/Chest: Effort normal and breath sounds normal  She has no decreased breath sounds  She has no wheezes  She has no rhonchi  She has no rales  Lymphadenopathy:     She has no cervical adenopathy  Neurological: She is alert  Skin: No rash noted  Psychiatric: She has a normal mood and affect  Vitals reviewed  Pertinent Laboratory/Diagnostic Studies:  Results for orders placed or performed in visit on 02/11/20   POCT rapid strepA   Result Value Ref Range     RAPID STREP A Negative Negative         ALLERGIES:  Allergies   Allergen Reactions    Codeine GI Intolerance    Erythromycin GI Intolerance    Percolone [Oxycodone] GI Intolerance    Topiramate Other (See Comments)     vomitting    Lamotrigine Rash     Pt claims she does not have an allergy to lamotrigine       Current Medications     Current Outpatient Medications   Medication Sig Dispense Refill    amLODIPine (NORVASC) 2 5 mg tablet Take 1 tablet (2 5 mg total) by mouth daily 90 tablet 1    atorvastatin (LIPITOR) 40 mg tablet Take 1 tablet (40 mg total) by mouth daily 90 tablet 1    carBAMazepine (TEGretol) 200 mg tablet Take 200 mg by mouth daily        clonazePAM (KlonoPIN) 2 mg tablet Take 2 mg by mouth      metoprolol succinate (TOPROL-XL) 100 mg 24 hr tablet Take 1 tablet (100 mg total) by mouth daily 90 tablet 1    traZODone (DESYREL) 150 mg tablet Take 150 mg by mouth daily at bedtime       No current facility-administered medications for this visit            Kaykay Lacy PA-C  2/11/2020 2:00 PM  [facility]

## 2020-02-13 ENCOUNTER — TELEPHONE (OUTPATIENT)
Dept: FAMILY MEDICINE CLINIC | Facility: CLINIC | Age: 55
End: 2020-02-13

## 2020-02-13 DIAGNOSIS — J06.9 UPPER RESPIRATORY TRACT INFECTION, UNSPECIFIED TYPE: Primary | ICD-10-CM

## 2020-02-13 RX ORDER — ALBUTEROL SULFATE 90 UG/1
2 AEROSOL, METERED RESPIRATORY (INHALATION) EVERY 6 HOURS PRN
Qty: 1 INHALER | Refills: 0 | Status: SHIPPED | OUTPATIENT
Start: 2020-02-13 | End: 2021-09-10

## 2020-02-13 RX ORDER — BENZONATATE 100 MG/1
100-200 CAPSULE ORAL 3 TIMES DAILY PRN
Qty: 30 CAPSULE | Refills: 0 | Status: SHIPPED | OUTPATIENT
Start: 2020-02-13 | End: 2020-05-19

## 2020-02-13 NOTE — TELEPHONE ENCOUNTER
Throat culture is negative so far  Does she want a cough medication to try? I can give her Tessalon Perles to use for cough and an albuterol inhaler to use as needed

## 2020-02-13 NOTE — TELEPHONE ENCOUNTER
Spoke with pt, please sent tessaljack crewses and albuterol inhaler to her [employer]

## 2020-02-13 NOTE — TELEPHONE ENCOUNTER
Pt called today c/o  ichronic cough ,  fever, fatigue, possible bronchitis  Pt saw you on Tuesday, and sh is feeling horrible    Called [facility] labs , Throat culture still pending

## 2020-02-16 LAB — BACTERIA THROAT CULT: ABNORMAL

## 2020-02-17 DIAGNOSIS — J02.0 STREP THROAT: Primary | ICD-10-CM

## 2020-02-17 RX ORDER — AMOXICILLIN 875 MG/1
875 TABLET, COATED ORAL 2 TIMES DAILY
Qty: 20 TABLET | Refills: 0 | Status: SHIPPED | OUTPATIENT
Start: 2020-02-17 | End: 2020-02-27

## 2020-03-23 DIAGNOSIS — I10 ESSENTIAL HYPERTENSION: ICD-10-CM

## 2020-03-23 RX ORDER — AMLODIPINE BESYLATE 2.5 MG/1
2.5 TABLET ORAL DAILY
Qty: 90 TABLET | Refills: 1 | Status: SHIPPED | OUTPATIENT
Start: 2020-03-23 | End: 2020-05-19

## 2020-04-06 DIAGNOSIS — I10 ESSENTIAL HYPERTENSION: ICD-10-CM

## 2020-04-06 DIAGNOSIS — E78.2 MIXED HYPERLIPIDEMIA: ICD-10-CM

## 2020-04-07 RX ORDER — METOPROLOL SUCCINATE 100 MG/1
100 TABLET, EXTENDED RELEASE ORAL DAILY
Qty: 90 TABLET | Refills: 1 | Status: SHIPPED | OUTPATIENT
Start: 2020-04-07 | End: 2020-09-08 | Stop reason: SDUPTHER

## 2020-04-07 RX ORDER — ATORVASTATIN CALCIUM 40 MG/1
40 TABLET, FILM COATED ORAL DAILY
Qty: 90 TABLET | Refills: 1 | Status: SHIPPED | OUTPATIENT
Start: 2020-04-07 | End: 2020-09-08 | Stop reason: SDUPTHER

## 2020-04-12 ENCOUNTER — NURSE TRIAGE (OUTPATIENT)
Dept: OTHER | Facility: OTHER | Age: 55
End: 2020-04-12

## 2020-05-18 ENCOUNTER — TRANSITIONAL CARE MANAGEMENT (OUTPATIENT)
Dept: FAMILY MEDICINE CLINIC | Facility: CLINIC | Age: 55
End: 2020-05-18

## 2020-05-19 ENCOUNTER — OFFICE VISIT (OUTPATIENT)
Dept: FAMILY MEDICINE CLINIC | Facility: CLINIC | Age: 55
End: 2020-05-19
Payer: COMMERCIAL

## 2020-05-19 VITALS
DIASTOLIC BLOOD PRESSURE: 80 MMHG | OXYGEN SATURATION: 97 % | HEART RATE: 68 BPM | TEMPERATURE: 98.4 F | SYSTOLIC BLOOD PRESSURE: 110 MMHG

## 2020-05-19 DIAGNOSIS — S82.121D CLOSED FRACTURE OF LATERAL PORTION OF RIGHT TIBIAL PLATEAU WITH ROUTINE HEALING, SUBSEQUENT ENCOUNTER: Primary | ICD-10-CM

## 2020-05-19 DIAGNOSIS — I10 ESSENTIAL HYPERTENSION: ICD-10-CM

## 2020-05-19 DIAGNOSIS — Z72.0 TOBACCO ABUSE: ICD-10-CM

## 2020-05-19 DIAGNOSIS — N32.81 OVERACTIVE BLADDER: ICD-10-CM

## 2020-05-19 DIAGNOSIS — R73.03 PREDIABETES: ICD-10-CM

## 2020-05-19 DIAGNOSIS — I26.99 PULMONARY EMBOLISM DURING TREATMENT WITH LONG-TERM ANTICOAGULATION THERAPY (HCC): ICD-10-CM

## 2020-05-19 DIAGNOSIS — I82.4Z1 ACUTE DEEP VEIN THROMBOSIS (DVT) OF DISTAL VEIN OF RIGHT LOWER EXTREMITY (HCC): ICD-10-CM

## 2020-05-19 DIAGNOSIS — Z79.01 PULMONARY EMBOLISM DURING TREATMENT WITH LONG-TERM ANTICOAGULATION THERAPY (HCC): ICD-10-CM

## 2020-05-19 PROBLEM — I82.409 DVT (DEEP VENOUS THROMBOSIS) (HCC): Status: ACTIVE | Noted: 2020-05-01

## 2020-05-19 PROBLEM — S82.121A CLOSED FRACTURE OF LATERAL PORTION OF RIGHT TIBIAL PLATEAU: Status: ACTIVE | Noted: 2020-04-13

## 2020-05-19 PROCEDURE — 99496 TRANSJ CARE MGMT HIGH F2F 7D: CPT | Performed by: PHYSICIAN ASSISTANT

## 2020-05-19 RX ORDER — RIVAROXABAN 20 MG/1
20 TABLET, FILM COATED ORAL DAILY
COMMUNITY
Start: 2020-05-14 | End: 2021-09-10

## 2020-05-19 RX ORDER — MORPHINE SULFATE 15 MG/1
15 TABLET ORAL EVERY 6 HOURS PRN
COMMUNITY
Start: 2020-05-14 | End: 2020-05-24

## 2020-05-22 ENCOUNTER — TELEPHONE (OUTPATIENT)
Dept: FAMILY MEDICINE CLINIC | Facility: CLINIC | Age: 55
End: 2020-05-22

## 2020-06-29 ENCOUNTER — OFFICE VISIT (OUTPATIENT)
Dept: FAMILY MEDICINE CLINIC | Facility: CLINIC | Age: 55
End: 2020-06-29
Payer: COMMERCIAL

## 2020-06-29 VITALS
WEIGHT: 150.13 LBS | TEMPERATURE: 97.6 F | DIASTOLIC BLOOD PRESSURE: 104 MMHG | SYSTOLIC BLOOD PRESSURE: 138 MMHG | HEIGHT: 62 IN | OXYGEN SATURATION: 96 % | HEART RATE: 86 BPM | BODY MASS INDEX: 27.63 KG/M2

## 2020-06-29 DIAGNOSIS — Z79.01 PULMONARY EMBOLISM DURING TREATMENT WITH LONG-TERM ANTICOAGULATION THERAPY (HCC): ICD-10-CM

## 2020-06-29 DIAGNOSIS — Z72.0 TOBACCO ABUSE: ICD-10-CM

## 2020-06-29 DIAGNOSIS — I10 ESSENTIAL HYPERTENSION: Primary | ICD-10-CM

## 2020-06-29 DIAGNOSIS — I26.99 PULMONARY EMBOLISM DURING TREATMENT WITH LONG-TERM ANTICOAGULATION THERAPY (HCC): ICD-10-CM

## 2020-06-29 DIAGNOSIS — N32.81 OVERACTIVE BLADDER: ICD-10-CM

## 2020-06-29 DIAGNOSIS — R73.03 PREDIABETES: ICD-10-CM

## 2020-06-29 DIAGNOSIS — S82.121D CLOSED FRACTURE OF LATERAL PORTION OF RIGHT TIBIAL PLATEAU WITH ROUTINE HEALING, SUBSEQUENT ENCOUNTER: ICD-10-CM

## 2020-06-29 DIAGNOSIS — I82.4Z1 ACUTE DEEP VEIN THROMBOSIS (DVT) OF DISTAL VEIN OF RIGHT LOWER EXTREMITY (HCC): ICD-10-CM

## 2020-06-29 DIAGNOSIS — E55.9 VITAMIN D DEFICIENCY: ICD-10-CM

## 2020-06-29 LAB — SL AMB POCT HEMOGLOBIN AIC: 5.4 (ref ?–6.5)

## 2020-06-29 PROCEDURE — 4004F PT TOBACCO SCREEN RCVD TLK: CPT | Performed by: PHYSICIAN ASSISTANT

## 2020-06-29 PROCEDURE — 3008F BODY MASS INDEX DOCD: CPT | Performed by: PHYSICIAN ASSISTANT

## 2020-06-29 PROCEDURE — 3075F SYST BP GE 130 - 139MM HG: CPT | Performed by: PHYSICIAN ASSISTANT

## 2020-06-29 PROCEDURE — 83036 HEMOGLOBIN GLYCOSYLATED A1C: CPT | Performed by: PHYSICIAN ASSISTANT

## 2020-06-29 PROCEDURE — 99214 OFFICE O/P EST MOD 30 MIN: CPT | Performed by: PHYSICIAN ASSISTANT

## 2020-06-29 PROCEDURE — 3080F DIAST BP >= 90 MM HG: CPT | Performed by: PHYSICIAN ASSISTANT

## 2020-06-29 RX ORDER — AMLODIPINE BESYLATE 2.5 MG/1
2.5 TABLET ORAL DAILY
Qty: 30 TABLET | Refills: 1 | Status: SHIPPED | OUTPATIENT
Start: 2020-06-29 | End: 2020-12-11 | Stop reason: SDUPTHER

## 2020-06-29 RX ORDER — TOLTERODINE 2 MG/1
2 CAPSULE, EXTENDED RELEASE ORAL DAILY
Qty: 30 CAPSULE | Refills: 3 | Status: SHIPPED | OUTPATIENT
Start: 2020-06-29 | End: 2020-07-28

## 2020-06-29 RX ORDER — ERGOCALCIFEROL 1.25 MG/1
50000 CAPSULE ORAL WEEKLY
Qty: 12 CAPSULE | Refills: 0 | Status: SHIPPED | OUTPATIENT
Start: 2020-06-29 | End: 2021-09-10

## 2020-06-29 RX ORDER — TRAMADOL HYDROCHLORIDE 50 MG/1
50 TABLET ORAL EVERY 6 HOURS PRN
COMMUNITY
Start: 2020-06-22 | End: 2021-05-24

## 2020-07-25 NOTE — PROGRESS NOTES
FAMILY PRACTICE OFFICE VISIT  [facility] - [facility]       NAME: Raffaele Renteria  AGE: 48 y o  SEX: female       : 1965        MRN: [id number]    DATE: 2020  TIME: 1:24 AM    Assessment and Plan     Problem List Items Addressed This Visit        Cardiovascular and Mediastinum    Essential hypertension - Primary     Well controlled  Continue with amlodipine 2 5 mg daily and metoprolol 100 mg daily  Genitourinary    Overactive bladder     Did not notice any difference with cutting back on caffeine or using Detrol  She was given a script to switch to Sanctura 20 mg twice daily and also directed to follow-up with Urology  She was additionally given a slip to get a pelvic ultrasound done to assess for possible ovarian cyst as she reports that this was the cause of her symptoms about to decades ago and wants to evaluate for this being a recurrent issue  Relevant Medications    trospium chloride (SANCTURA) 20 mg tablet    Other Relevant Orders    US pelvis complete w transvaginal    Ambulatory referral to Urology       Other    Tobacco abuse     Patient reports she is now back up to at least a half a pack a day  She does not like nicotine gum due to a tasting terrible and feeling like she is chewing on lorena  She is not currently interested in quitting smoking anymore  She has that have the dry that she did at her last visit  Tobacco Cessation Counseling: Tobacco cessation counseling and education was provided  The patient is sincerely urged to quit consumption of tobacco  She is not ready to quit tobacco  The numerous health risks of tobacco consumption were discussed  If she decides to quit, there are a number of helpful adjunctive aids, and she can see me to discuss nicotine replacement therapy, chantix, or bupropion anytime in the future  Hair loss     Check labs as ordered today    Reviewed that amlodipine and Detrol did not have this at side effects  Other Visit Diagnoses     Screening for HIV (human immunodeficiency virus)        Relevant Orders    Human Immunodeficiency Virus 1/2 Antigen / Antibody ( Fourth Generation) with Reflex Testing      The USPSTF recommendation for HIV screening in all patients between 13and 72years old (once in lifetime or annually with risk factors) was discussed with the patient  The patient agreed to testing  Essential hypertension  Well controlled  Continue with amlodipine 2 5 mg daily and metoprolol 100 mg daily  Overactive bladder  Did not notice any difference with cutting back on caffeine or using Detrol  She was given a script to switch to Sanctura 20 mg twice daily and also directed to follow-up with Urology  She was additionally given a slip to get a pelvic ultrasound done to assess for possible ovarian cyst as she reports that this was the cause of her symptoms about to decades ago and wants to evaluate for this being a recurrent issue  Hair loss  Check labs as ordered today  Reviewed that amlodipine and Detrol did not have this at side effects  Tobacco abuse  Patient reports she is now back up to at least a half a pack a day  She does not like nicotine gum due to a tasting terrible and feeling like she is chewing on lorena  She is not currently interested in quitting smoking anymore  She has that have the dry that she did at her last visit  Tobacco Cessation Counseling: Tobacco cessation counseling and education was provided  The patient is sincerely urged to quit consumption of tobacco  She is not ready to quit tobacco  The numerous health risks of tobacco consumption were discussed  If she decides to quit, there are a number of helpful adjunctive aids, and she can see me to discuss nicotine replacement therapy, chantix, or bupropion anytime in the future                Chief Complaint     Chief Complaint   Patient presents with    Follow-up     HTN History of Present Illness   Janis Bess is a 48y o -year-old female who presents for 1 month follow-up on HTN  Last month at her last visit, her blood pressure was uncontrolled  She was encouraged to restart taking amlodipine 2 5 mg daily  Blood pressure readings at home are not checked - 120s/80s when initially started it  The patient denies symptoms of poor control such as chest pain, shortness of breath, leg swelling, vision changes, or dizziness  The patient reports decreased caffeine intake (usually 1 coffee a day) and limited salt intake  She also noted difficulty with an overactive bladder that was not benefitting from Kegel exercises  She was reminded to try to cut caffeine out of her diet  She states that she has worked hard on getting rid of her caffeine for the most part  She states that she even has had a few headaches due to caffeine cut back  She was also given a script for Detrol LA 2 mg daily to start  She has found this to not be helpful  She states that she had something like this was in the 1990s and notes that she had cysts on the ovaries leaning on her bladder  She did not have pain at that time nor does she have it now but to verify that that is not what is causing her symptoms  We discussed the importance of quitting smoking at her last visit  She reported at the time smoking up to 3 cigarettes a day  She was given nicotine gum to try and she reports finding this to be terrible tasting  She states that she will not use this because it feels like she is chewing on lorena  Review of Systems   Review of Systems   Constitutional: Negative for chills and fever  Respiratory: Negative for shortness of breath  Cardiovascular: Negative for chest pain, palpitations and leg swelling  Genitourinary:        Incontinence   Neurological: Negative for dizziness and headaches         Active Problem List     Patient Active Problem List   Diagnosis    Abdominal pain of multiple sites    Alternating constipation and diarrhea    Anemia    Anxiety    Back muscle spasm    [facility] disorder, mixed ([facility])    Bipolar I disorder, single manic episode ([facility])    Bloating    Breast pain    Cervical polyp    Chronic migraine without aura    F[facility] cancer [facility] breast ([facility] [address] [facility]    Female pelvic pain    Hyperlipidemia    Essential hypertension    Mammogram abnormal    Nephrolithiasis    Overactive bladder    Stress incontinence, female    Tobacco abuse    Vertigo    Excessive daytime sleepiness    Thyromegaly    Prediabetes    Overweight (BMI 25 0-29  9)    Tear of left scapholunate ligament    Occult closed fracture of scaphoid bone of left wrist    Pulmonary embolism during treatment with long-term anticoagulation therapy ([facility] [address] )    Closed fracture of lateral portion of right tibial plateau    DVT (deep venous thrombosis) ([facility])    Vitamin D deficiency    Hair loss         Past Medical History:  Past Medical History:   Diagnosis Date    Abdominal mass, LLQ (left lower quadrant)     resolved 06/19/2015    Anemia     Anxiety     Bipolar disorder ([facility])     Cervicalgia     resolved 02/18/2015    Depression     with anxiety per allscripts    Esophageal reflux     resolved 01/12/2015    Fracture of humerus     s/p ORIF R humerus resolved 07/18/2017    GERD (gastroesophageal reflux disease)     Headache     Heartburn     last assessed 10/11/2012    Hypertension     Migraine     Polyuria     resolved 07/18/2017    Skull fracture ([facility] [address] ) 2001    s/p trauma 2008 s/p fall at work    Subdural hemorrhage ([facility] [address] ) 2008    skull fracture    Urinary frequency     resolved 02/18/2015    Urinary incontinence     resolved 07/18/2017    Vertigo        Past Surgical History:  Past Surgical History:   Procedure Laterality Date    ABDOMINAL SURGERY      abdominal mass    BLADDER SUSPENSION      BONE BIOPSY      open    BREAST LUMPECTOMY      age 34 benign per allscripts    DILATION AND CURETTAGE OF UTERUS      x2 due to bleeding per allscripts    ELBOW SURGERY Right     per allscripts    ENDOMETRIAL ABLATION      vaginal lesion(s) hydrothermal ablation     HUMERUS FRACTURE SURGERY      s/p fall with plate    HYSTEROSCOPY W/ ENDOMETRIAL ABLATION      OTHER SURGICAL HISTORY      surgical treatment for  age 14    Billy Glover OVARIAN CYST REMOVAL Left     OVARY SURGERY Right     cystectomy per allscripts    NC COLONOSCOPY FLX DX W/COLLJ SPEC WHEN PFRMD N/A 11/15/2017    Procedure: COLONOSCOPY;  Surgeon: Kari Rose MD;  Location[facility];   Service: Gastroenterology    SHOULDER SURGERY Right     TUBAL LIGATION      URETHRAL SLING      mid per allscripts       Family History:  Family History   Problem Relation Age of Onset   Billy Lubeck Breast cancer Mother     Hypertension Mother     Asthma Father     Coronary artery disease Father    Billy Adalberto Diabetes Father     Hypertension Father     Diabetes type II Father     Breast cancer Sister     Diabetes Other     Heart disease Other     Hypertension Other        Social History:  Social History     Socioeconomic History    Marital status: /Civil Union     Spouse name: Not on file    Number of children: Not on file    Years of education: Not on file    Highest education level: Not on file   Occupational History    Not on file   Social Needs    Financial resource strain: Not on file    Food insecurity:     Worry: Not on file     Inability: Not on file    Transportation needs:     Medical: Not on file     Non-medical: Not on file   Tobacco Use    Smoking status: Current Some Day Smoker     Packs/day: 0 25    Smokeless tobacco: Never Used    Tobacco comment: active smoker per allscripts,   Substance and Sexual Activity    Alcohol use: Yes     Frequency: Monthly or less     Comment: social, moderate per allscripts, special occasions    Drug use: No    Sexual activity: Yes     Partners: Male   Lifestyle    Physical activity:     Days per week: Not on file     Minutes per session: Not on file    Stress: Not on file   Relationships    Social connections:     Talks on phone: Not on file     Gets together: Not on file     Attends Mu-ism service: Not on file     Active member of club or organization: Not on file     Attends meetings of clubs or organizations: Not on file     Relationship status: Not on file    Intimate partner violence:     Fear of current or ex partner: Not on file     Emotionally abused: Not on file     Physically abused: Not on file     Forced sexual activity: Not on file   Other Topics Concern    Not on file   Social History Narrative    Caffeine use    Lives with adult children           Objective     Vitals:    07/28/20 1133   BP: 128/80   BP Location: Left arm   Patient Position: Sitting   Cuff Size: Standard   Pulse: 82   SpO2: 96%   Weight: 67 2 kg (148 lb 4 oz)   Height: 5' 2" (1 575 m)     Wt Readings from Last 3 Encounters:   07/28/20 67 2 kg (148 lb 4 oz)   06/29/20 68 1 kg (150 lb 2 oz)   02/11/20 70 8 kg (156 lb)       Physical Exam   Constitutional: She appears well-developed and well-nourished  No distress  Neck: Neck supple  No thyromegaly present  Cardiovascular: Normal rate, regular rhythm, normal heart sounds and intact distal pulses  No murmur heard  Pulmonary/Chest: Effort normal and breath sounds normal  She has no wheezes  She has no rales  Musculoskeletal: She exhibits no edema  Lymphadenopathy:     She has no cervical adenopathy  Psychiatric: She has a normal mood and affect  Vitals reviewed        Pertinent Laboratory/Diagnostic Studies:  Lab Results   Component Value Date    GLUCOSE 107 10/07/2014    BUN 8 08/14/2019    CREATININE 0 82 08/14/2019    CALCIUM 8 9 08/14/2019     10/07/2014    K 3 8 08/14/2019    CO2 32 08/14/2019     08/14/2019     Lab Results   Component Value Date    ALT 26 08/14/2019    AST 16 08/14/2019    ALKPHOS 82 08/14/2019 BILITOT 0 28 10/07/2014       Lab Results   Component Value Date    WBC 5 16 08/14/2019    HGB 15 6 (H) 08/14/2019    HCT 48 0 (H) 08/14/2019    MCV 93 08/14/2019     08/14/2019     Lab Results   Component Value Date    CHOL 229 05/05/2014     Lab Results   Component Value Date    TRIG 235 (H) 08/14/2019     Lab Results   Component Value Date    HDL 50 08/14/2019     Lab Results   Component Value Date    LDLCALC 71 08/14/2019     Lab Results   Component Value Date    HGBA1C 5 4 06/29/2020       Results for orders placed or performed in visit on 06/29/20   POCT hemoglobin A1c   Result Value Ref Range    Hemoglobin A1C 5 4 6 5       Orders Placed This Encounter   Procedures    US pelvis complete w transvaginal    Human Immunodeficiency Virus 1/2 Antigen / Antibody ( Fourth Generation) with Reflex Testing    Ambulatory referral to Urology       ALLERGIES:  Allergies   Allergen Reactions    Codeine GI Intolerance    Erythromycin GI Intolerance    Percolone [Oxycodone] GI Intolerance    Topiramate Other (See Comments)     vomitting    Lamotrigine Rash     Pt claims she does not have an allergy to lamotrigine       Current Medications     Current Outpatient Medications   Medication Sig Dispense Refill    albuterol (PROVENTIL HFA,VENTOLIN HFA) 90 mcg/act inhaler Inhale 2 puffs every 6 (six) hours as needed for wheezing or shortness of breath 1 Inhaler 0    amLODIPine (NORVASC) 2 5 mg tablet Take 1 tablet (2 5 mg total) by mouth daily 30 tablet 1    atorvastatin (LIPITOR) 40 mg tablet Take 1 tablet (40 mg total) by mouth daily 90 tablet 1    carBAMazepine (TEGretol) 200 mg tablet Take 200 mg by mouth daily        clonazePAM (KlonoPIN) 2 mg tablet Take 2 mg by mouth daily       ergocalciferol (VITAMIN D2) 50,000 units Take 1 capsule (50,000 Units total) by mouth once a week 12 capsule 0    metoprolol succinate (TOPROL-XL) 100 mg 24 hr tablet Take 1 tablet (100 mg total) by mouth daily 90 tablet 1    traMADol (ULTRAM) 50 mg tablet Take 50 mg by mouth every 6 (six) hours as needed      traZODone (DESYREL) 150 mg tablet Take 150 mg by mouth daily at bedtime      XARELTO 20 MG tablet Take 20 mg by mouth daily       nicotine polacrilex (NICORETTE) 2 mg gum Chew 1 each (2 mg total) as needed for smoking cessation (Patient not taking: Reported on 7/28/2020) 100 each 2    trospium chloride (SANCTURA) 20 mg tablet Take 1 tablet (20 mg total) by mouth 2 (two) times a day 60 tablet 1     No current facility-administered medications for this visit            Health Maintenance     Health Maintenance   Topic Date Due    HIV Screening  09/07/1982    Annual Physical  09/07/1985    MAMMOGRAM  01/16/2019    Influenza Vaccine  07/01/2020    BMI: Followup Plan  09/24/2020    Cervical Cancer Screening  12/14/2020    BMI: Adult  07/28/2021    CRC Screening: Colonoscopy  11/15/2022    DTaP,Tdap,and Td Vaccines (4 - Td) 08/30/2027    Pneumococcal Vaccine: 65+ Years (1 of 2 - PCV13) 09/07/2032    Pneumococcal Vaccine: Pediatrics (0 to 5 Years) and At-Risk Patients (6 to 59 Years)  Completed    HIB Vaccine  Aged Out    Hepatitis B Vaccine  Aged Out    IPV Vaccine  Aged Out    Hepatitis A Vaccine  Aged Out    Meningococcal ACWY Vaccine  Aged Out    HPV Vaccine  Aged [employer] History   Administered Date(s) Administered    Influenza Quadrivalent Preservative Free 3 years and older IM 10/16/2014, 11/11/2016    Influenza Quadrivalent, 6-35 Months IM 11/02/2015, 08/30/2017    Influenza, recombinant, quadrivalent,injectable, preservative free 09/24/2019    Pneumococcal Polysaccharide PPV23 05/21/2019    Td (adult), adsorbed 08/30/2017    Tdap 01/01/2007, 10/03/2007       Cielo Lopez PA-C  7/29/2020 1:24 AM  [facility] [place]

## 2020-07-28 ENCOUNTER — OFFICE VISIT (OUTPATIENT)
Dept: FAMILY MEDICINE CLINIC | Facility: CLINIC | Age: 55
End: 2020-07-28
Payer: COMMERCIAL

## 2020-07-28 VITALS
DIASTOLIC BLOOD PRESSURE: 80 MMHG | OXYGEN SATURATION: 96 % | HEART RATE: 82 BPM | SYSTOLIC BLOOD PRESSURE: 128 MMHG | HEIGHT: 62 IN | BODY MASS INDEX: 27.28 KG/M2 | WEIGHT: 148.25 LBS

## 2020-07-28 DIAGNOSIS — I10 ESSENTIAL HYPERTENSION: Primary | ICD-10-CM

## 2020-07-28 DIAGNOSIS — L65.9 HAIR LOSS: ICD-10-CM

## 2020-07-28 DIAGNOSIS — Z72.0 TOBACCO ABUSE: ICD-10-CM

## 2020-07-28 DIAGNOSIS — N32.81 OVERACTIVE BLADDER: ICD-10-CM

## 2020-07-28 DIAGNOSIS — Z11.4 SCREENING FOR HIV (HUMAN IMMUNODEFICIENCY VIRUS): ICD-10-CM

## 2020-07-28 PROCEDURE — 4004F PT TOBACCO SCREEN RCVD TLK: CPT | Performed by: PHYSICIAN ASSISTANT

## 2020-07-28 PROCEDURE — 3079F DIAST BP 80-89 MM HG: CPT | Performed by: PHYSICIAN ASSISTANT

## 2020-07-28 PROCEDURE — 3008F BODY MASS INDEX DOCD: CPT | Performed by: PHYSICIAN ASSISTANT

## 2020-07-28 PROCEDURE — 3074F SYST BP LT 130 MM HG: CPT | Performed by: PHYSICIAN ASSISTANT

## 2020-07-28 PROCEDURE — 99214 OFFICE O/P EST MOD 30 MIN: CPT | Performed by: PHYSICIAN ASSISTANT

## 2020-07-28 RX ORDER — TROSPIUM CHLORIDE 20 MG/1
20 TABLET, FILM COATED ORAL 2 TIMES DAILY
Qty: 60 TABLET | Refills: 1 | Status: SHIPPED | OUTPATIENT
Start: 2020-07-28 | End: 2020-11-30 | Stop reason: SDUPTHER

## 2020-07-29 NOTE — ASSESSMENT & PLAN NOTE
Patient reports she is now back up to at least a half a pack a day  She does not like nicotine gum due to a tasting terrible and feeling like she is chewing on lorena  She is not currently interested in quitting smoking anymore  She has that have the dry that she did at her last visit  Tobacco Cessation Counseling: Tobacco cessation counseling and education was provided  The patient is sincerely urged to quit consumption of tobacco  She is not ready to quit tobacco  The numerous health risks of tobacco consumption were discussed  If she decides to quit, there are a number of helpful adjunctive aids, and she can see me to discuss nicotine replacement therapy, chantix, or bupropion anytime in the future

## 2020-07-29 NOTE — ASSESSMENT & PLAN NOTE
Did not notice any difference with cutting back on caffeine or using Detrol  She was given a script to switch to Sanctura 20 mg twice daily and also directed to follow-up with Urology  She was additionally given a slip to get a pelvic ultrasound done to assess for possible ovarian cyst as she reports that this was the cause of her symptoms about to decades ago and wants to evaluate for this being a recurrent issue

## 2020-08-22 NOTE — ASSESSMENT & PLAN NOTE
The patient currently has uncontrolled hypertension  It appears that this is likely the cause of her recent dizziness  Her metoprolol was increased to 50 mg daily  She can double up on the 25 mg tablets that she has at home and then fill the script if needed for the 50 mg tablets that she was provided today  She will return early next week for recheck on her blood pressure  She was encouraged to bring her blood pressure monitor to that appointment  She was also directed to work on decreasing her caffeine intake throughout the day and limiting her salt as well  She should call for symptoms are worsening or failing to improve in the interim prior to her next follow-up in a few days 
We discussed that based upon her exam, it does appear that it is still possible that she could be experiencing some vertigo in addition to her uncontrolled hypertension  She was encouraged to continue to try the meclizine if needed for relief beyond controlling her blood pressure with the increase in metoprolol  We will reassess in a few days at start of next week at her next visit 
No complaints

## 2020-09-02 ENCOUNTER — APPOINTMENT (OUTPATIENT)
Dept: LAB | Age: 55
End: 2020-09-02
Payer: COMMERCIAL

## 2020-09-02 DIAGNOSIS — R73.03 PREDIABETES: ICD-10-CM

## 2020-09-02 DIAGNOSIS — Z11.4 SCREENING FOR HIV (HUMAN IMMUNODEFICIENCY VIRUS): ICD-10-CM

## 2020-09-02 DIAGNOSIS — I10 ESSENTIAL HYPERTENSION: ICD-10-CM

## 2020-09-02 LAB
ALBUMIN SERPL BCP-MCNC: 3.6 G/DL (ref 3.5–5)
ALP SERPL-CCNC: 93 U/L (ref 46–116)
ALT SERPL W P-5'-P-CCNC: 19 U/L (ref 12–78)
ANION GAP SERPL CALCULATED.3IONS-SCNC: 2 MMOL/L (ref 4–13)
AST SERPL W P-5'-P-CCNC: 12 U/L (ref 5–45)
BASOPHILS # BLD AUTO: 0.05 THOUSANDS/ΜL (ref 0–0.1)
BASOPHILS NFR BLD AUTO: 1 % (ref 0–1)
BILIRUB SERPL-MCNC: 0.35 MG/DL (ref 0.2–1)
BUN SERPL-MCNC: 13 MG/DL (ref 5–25)
CALCIUM SERPL-MCNC: 9.2 MG/DL (ref 8.3–10.1)
CHLORIDE SERPL-SCNC: 105 MMOL/L (ref 100–108)
CO2 SERPL-SCNC: 32 MMOL/L (ref 21–32)
CREAT SERPL-MCNC: 0.8 MG/DL (ref 0.6–1.3)
EOSINOPHIL # BLD AUTO: 0.08 THOUSAND/ΜL (ref 0–0.61)
EOSINOPHIL NFR BLD AUTO: 2 % (ref 0–6)
ERYTHROCYTE [DISTWIDTH] IN BLOOD BY AUTOMATED COUNT: 11.9 % (ref 11.6–15.1)
EST. AVERAGE GLUCOSE BLD GHB EST-MCNC: 117 MG/DL
GFR SERPL CREATININE-BSD FRML MDRD: 85 ML/MIN/1.73SQ M
GLUCOSE P FAST SERPL-MCNC: 110 MG/DL (ref 65–99)
HBA1C MFR BLD: 5.7 %
HCT VFR BLD AUTO: 47.2 % (ref 34.8–46.1)
HGB BLD-MCNC: 15.8 G/DL (ref 11.5–15.4)
IMM GRANULOCYTES # BLD AUTO: 0.01 THOUSAND/UL (ref 0–0.2)
IMM GRANULOCYTES NFR BLD AUTO: 0 % (ref 0–2)
LYMPHOCYTES # BLD AUTO: 2.67 THOUSANDS/ΜL (ref 0.6–4.47)
LYMPHOCYTES NFR BLD AUTO: 55 % (ref 14–44)
MCH RBC QN AUTO: 30.2 PG (ref 26.8–34.3)
MCHC RBC AUTO-ENTMCNC: 33.5 G/DL (ref 31.4–37.4)
MCV RBC AUTO: 90 FL (ref 82–98)
MONOCYTES # BLD AUTO: 0.32 THOUSAND/ΜL (ref 0.17–1.22)
MONOCYTES NFR BLD AUTO: 7 % (ref 4–12)
NEUTROPHILS # BLD AUTO: 1.66 THOUSANDS/ΜL (ref 1.85–7.62)
NEUTS SEG NFR BLD AUTO: 35 % (ref 43–75)
NRBC BLD AUTO-RTO: 0 /100 WBCS
PLATELET # BLD AUTO: 83 THOUSANDS/UL (ref 149–390)
PMV BLD AUTO: 11 FL (ref 8.9–12.7)
POTASSIUM SERPL-SCNC: 4 MMOL/L (ref 3.5–5.3)
PROT SERPL-MCNC: 7.8 G/DL (ref 6.4–8.2)
RBC # BLD AUTO: 5.23 MILLION/UL (ref 3.81–5.12)
SODIUM SERPL-SCNC: 139 MMOL/L (ref 136–145)
WBC # BLD AUTO: 4.79 THOUSAND/UL (ref 4.31–10.16)

## 2020-09-02 PROCEDURE — 85025 COMPLETE CBC W/AUTO DIFF WBC: CPT

## 2020-09-02 PROCEDURE — 87389 HIV-1 AG W/HIV-1&-2 AB AG IA: CPT

## 2020-09-02 PROCEDURE — 80053 COMPREHEN METABOLIC PANEL: CPT

## 2020-09-02 PROCEDURE — 83036 HEMOGLOBIN GLYCOSYLATED A1C: CPT

## 2020-09-02 PROCEDURE — 36415 COLL VENOUS BLD VENIPUNCTURE: CPT

## 2020-09-03 DIAGNOSIS — D69.6 THROMBOCYTOPENIA (HCC): Primary | ICD-10-CM

## 2020-09-03 LAB — HIV 1+2 AB+HIV1 P24 AG SERPL QL IA: NORMAL

## 2020-09-08 DIAGNOSIS — E78.2 MIXED HYPERLIPIDEMIA: ICD-10-CM

## 2020-09-08 DIAGNOSIS — I10 ESSENTIAL HYPERTENSION: ICD-10-CM

## 2020-09-09 RX ORDER — METOPROLOL SUCCINATE 100 MG/1
100 TABLET, EXTENDED RELEASE ORAL DAILY
Qty: 90 TABLET | Refills: 1 | Status: SHIPPED | OUTPATIENT
Start: 2020-09-09 | End: 2021-03-12 | Stop reason: SDUPTHER

## 2020-09-09 RX ORDER — ATORVASTATIN CALCIUM 40 MG/1
40 TABLET, FILM COATED ORAL DAILY
Qty: 90 TABLET | Refills: 1 | Status: SHIPPED | OUTPATIENT
Start: 2020-09-09 | End: 2021-04-07 | Stop reason: SDUPTHER

## 2020-09-21 ENCOUNTER — APPOINTMENT (OUTPATIENT)
Dept: LAB | Age: 55
End: 2020-09-21
Payer: COMMERCIAL

## 2020-09-21 DIAGNOSIS — D69.6 THROMBOCYTOPENIA (HCC): ICD-10-CM

## 2020-09-21 LAB
BASOPHILS # BLD AUTO: 0.06 THOUSANDS/ΜL (ref 0–0.1)
BASOPHILS NFR BLD AUTO: 1 % (ref 0–1)
EOSINOPHIL # BLD AUTO: 0.12 THOUSAND/ΜL (ref 0–0.61)
EOSINOPHIL NFR BLD AUTO: 2 % (ref 0–6)
ERYTHROCYTE [DISTWIDTH] IN BLOOD BY AUTOMATED COUNT: 12.2 % (ref 11.6–15.1)
HCT VFR BLD AUTO: 46.9 % (ref 34.8–46.1)
HGB BLD-MCNC: 15.5 G/DL (ref 11.5–15.4)
IMM GRANULOCYTES # BLD AUTO: 0.02 THOUSAND/UL (ref 0–0.2)
IMM GRANULOCYTES NFR BLD AUTO: 0 % (ref 0–2)
LYMPHOCYTES # BLD AUTO: 2.96 THOUSANDS/ΜL (ref 0.6–4.47)
LYMPHOCYTES NFR BLD AUTO: 53 % (ref 14–44)
MCH RBC QN AUTO: 30.2 PG (ref 26.8–34.3)
MCHC RBC AUTO-ENTMCNC: 33 G/DL (ref 31.4–37.4)
MCV RBC AUTO: 91 FL (ref 82–98)
MONOCYTES # BLD AUTO: 0.42 THOUSAND/ΜL (ref 0.17–1.22)
MONOCYTES NFR BLD AUTO: 7 % (ref 4–12)
NEUTROPHILS # BLD AUTO: 2.07 THOUSANDS/ΜL (ref 1.85–7.62)
NEUTS SEG NFR BLD AUTO: 37 % (ref 43–75)
NRBC BLD AUTO-RTO: 0 /100 WBCS
PLATELET # BLD AUTO: 87 THOUSANDS/UL (ref 149–390)
PMV BLD AUTO: 11.4 FL (ref 8.9–12.7)
RBC # BLD AUTO: 5.13 MILLION/UL (ref 3.81–5.12)
WBC # BLD AUTO: 5.65 THOUSAND/UL (ref 4.31–10.16)

## 2020-09-21 PROCEDURE — 85025 COMPLETE CBC W/AUTO DIFF WBC: CPT

## 2020-09-21 PROCEDURE — 36415 COLL VENOUS BLD VENIPUNCTURE: CPT

## 2020-11-30 DIAGNOSIS — N32.81 OVERACTIVE BLADDER: ICD-10-CM

## 2020-11-30 DIAGNOSIS — Z12.31 BREAST CANCER SCREENING BY MAMMOGRAM: Primary | ICD-10-CM

## 2020-11-30 RX ORDER — TROSPIUM CHLORIDE 20 MG/1
20 TABLET, FILM COATED ORAL 2 TIMES DAILY
Qty: 60 TABLET | Refills: 3 | Status: SHIPPED | OUTPATIENT
Start: 2020-11-30 | End: 2021-05-24 | Stop reason: SDUPTHER

## 2020-12-11 DIAGNOSIS — I10 ESSENTIAL HYPERTENSION: ICD-10-CM

## 2020-12-11 RX ORDER — AMLODIPINE BESYLATE 2.5 MG/1
2.5 TABLET ORAL DAILY
Qty: 30 TABLET | Refills: 3 | Status: SHIPPED | OUTPATIENT
Start: 2020-12-11 | End: 2021-04-15

## 2021-03-11 ENCOUNTER — HOSPITAL ENCOUNTER (OUTPATIENT)
Dept: MAMMOGRAPHY | Facility: MEDICAL CENTER | Age: 56
Discharge: HOME | End: 2021-03-11
Payer: COMMERCIAL

## 2021-03-11 VITALS — WEIGHT: 148 LBS | BODY MASS INDEX: 27.23 KG/M2 | HEIGHT: 62 IN

## 2021-03-11 DIAGNOSIS — Z12.31 BREAST CANCER SCREENING BY MAMMOGRAM: ICD-10-CM

## 2021-03-11 PROCEDURE — 77063 BREAST TOMOSYNTHESIS BI: CPT

## 2021-03-11 PROCEDURE — 77067 SCR MAMMO BI INCL CAD: CPT

## 2021-03-12 DIAGNOSIS — I10 ESSENTIAL HYPERTENSION: ICD-10-CM

## 2021-03-12 RX ORDER — METOPROLOL SUCCINATE 100 MG/1
100 TABLET, EXTENDED RELEASE ORAL DAILY
Qty: 90 TABLET | Refills: 1 | Status: SHIPPED | OUTPATIENT
Start: 2021-03-12 | End: 2021-05-24 | Stop reason: SDUPTHER

## 2021-04-02 ENCOUNTER — TELEPHONE (OUTPATIENT)
Dept: FAMILY MEDICINE CLINIC | Facility: CLINIC | Age: 56
End: 2021-04-02

## 2021-04-02 NOTE — TELEPHONE ENCOUNTER
I called patient and reviewed this with her  She notes that both codeine and Percocet caused severe projectile vomiting  She did not take the Vicodin prescription  She reports that she has increased the tramadol they gave her and is taking 100 mg every 6 hours  We reviewed that this is the max of 400 mg/24 hours so not to increase any further  If additional pain medication is needed, she should add Tylenol  She was also encouraged to reach out to the surgeon to let them know what she was taking as her supply will not last as long as they are going to be expecting

## 2021-04-02 NOTE — TELEPHONE ENCOUNTER
Spouse called to let you know pt had hand surge yesterday and due to her being in a lot of pain the doctor put her on a pain med but he wanted to put her on Vicodin  But wanted to make sure that she could take it first due to her not being able to tolerate a lot of medication I did look in her chart I did not find that she was on it in the past in less I over looked it please advise

## 2021-04-02 NOTE — TELEPHONE ENCOUNTER
I do not see record of her taking this before  I would say that it is okay for her to try it  She had GI intolerance with codeine according to the chart (not sure how severe) but no anaphylactic reaction  Please simply inform them that she could have a similar reaction to what she experienced with codeine

## 2021-04-07 DIAGNOSIS — E78.2 MIXED HYPERLIPIDEMIA: ICD-10-CM

## 2021-04-08 RX ORDER — ATORVASTATIN CALCIUM 40 MG/1
40 TABLET, FILM COATED ORAL DAILY
Qty: 90 TABLET | Refills: 0 | Status: SHIPPED | OUTPATIENT
Start: 2021-04-08 | End: 2021-05-24 | Stop reason: SDUPTHER

## 2021-04-14 DIAGNOSIS — I10 ESSENTIAL HYPERTENSION: ICD-10-CM

## 2021-04-15 RX ORDER — AMLODIPINE BESYLATE 2.5 MG/1
TABLET ORAL
Qty: 30 TABLET | Refills: 1 | Status: SHIPPED | OUTPATIENT
Start: 2021-04-15 | End: 2021-05-24 | Stop reason: SDUPTHER

## 2021-05-07 ENCOUNTER — TRANSCRIBE ORDERS (OUTPATIENT)
Dept: LAB | Age: 56
End: 2021-05-07

## 2021-05-07 ENCOUNTER — APPOINTMENT (OUTPATIENT)
Dept: LAB | Age: 56
End: 2021-05-07
Payer: COMMERCIAL

## 2021-05-07 DIAGNOSIS — Z79.899 ENCOUNTER FOR LONG-TERM (CURRENT) USE OF OTHER MEDICATIONS: Primary | ICD-10-CM

## 2021-05-07 DIAGNOSIS — Z79.899 ENCOUNTER FOR LONG-TERM (CURRENT) USE OF OTHER MEDICATIONS: ICD-10-CM

## 2021-05-07 LAB — CARBAMAZEPINE SERPL-MCNC: 7.5 UG/ML (ref 4–12)

## 2021-05-07 PROCEDURE — 36415 COLL VENOUS BLD VENIPUNCTURE: CPT

## 2021-05-07 PROCEDURE — 80156 ASSAY CARBAMAZEPINE TOTAL: CPT

## 2021-05-24 ENCOUNTER — OFFICE VISIT (OUTPATIENT)
Dept: FAMILY MEDICINE CLINIC | Facility: CLINIC | Age: 56
End: 2021-05-24
Payer: COMMERCIAL

## 2021-05-24 VITALS
SYSTOLIC BLOOD PRESSURE: 120 MMHG | HEIGHT: 62 IN | HEART RATE: 84 BPM | DIASTOLIC BLOOD PRESSURE: 78 MMHG | WEIGHT: 153 LBS | TEMPERATURE: 97.8 F | BODY MASS INDEX: 28.16 KG/M2 | OXYGEN SATURATION: 95 %

## 2021-05-24 DIAGNOSIS — Z12.4 ENCOUNTER FOR PAPANICOLAOU SMEAR FOR CERVICAL CANCER SCREENING: ICD-10-CM

## 2021-05-24 DIAGNOSIS — Z12.4 SCREENING FOR CERVICAL CANCER: ICD-10-CM

## 2021-05-24 DIAGNOSIS — E55.9 VITAMIN D DEFICIENCY: ICD-10-CM

## 2021-05-24 DIAGNOSIS — N32.81 OVERACTIVE BLADDER: ICD-10-CM

## 2021-05-24 DIAGNOSIS — Z72.0 TOBACCO ABUSE: ICD-10-CM

## 2021-05-24 DIAGNOSIS — E78.2 MIXED HYPERLIPIDEMIA: ICD-10-CM

## 2021-05-24 DIAGNOSIS — E66.3 OVERWEIGHT (BMI 25.0-29.9): ICD-10-CM

## 2021-05-24 DIAGNOSIS — R73.03 PREDIABETES: ICD-10-CM

## 2021-05-24 DIAGNOSIS — F41.9 ANXIETY: ICD-10-CM

## 2021-05-24 DIAGNOSIS — Z00.00 ANNUAL PHYSICAL EXAM: Primary | ICD-10-CM

## 2021-05-24 DIAGNOSIS — R82.90 CLOUDY URINE: ICD-10-CM

## 2021-05-24 DIAGNOSIS — F31.60 BIPOLAR 1 DISORDER, MIXED (HCC): ICD-10-CM

## 2021-05-24 DIAGNOSIS — I10 ESSENTIAL HYPERTENSION: ICD-10-CM

## 2021-05-24 DIAGNOSIS — E78.5 HYPERLIPIDEMIA, UNSPECIFIED HYPERLIPIDEMIA TYPE: ICD-10-CM

## 2021-05-24 PROCEDURE — 3074F SYST BP LT 130 MM HG: CPT | Performed by: PHYSICIAN ASSISTANT

## 2021-05-24 PROCEDURE — 4004F PT TOBACCO SCREEN RCVD TLK: CPT | Performed by: PHYSICIAN ASSISTANT

## 2021-05-24 PROCEDURE — 3078F DIAST BP <80 MM HG: CPT | Performed by: PHYSICIAN ASSISTANT

## 2021-05-24 PROCEDURE — 3008F BODY MASS INDEX DOCD: CPT | Performed by: PHYSICIAN ASSISTANT

## 2021-05-24 PROCEDURE — 99396 PREV VISIT EST AGE 40-64: CPT | Performed by: PHYSICIAN ASSISTANT

## 2021-05-24 RX ORDER — TROSPIUM CHLORIDE 20 MG/1
20 TABLET, FILM COATED ORAL 2 TIMES DAILY
Qty: 180 TABLET | Refills: 1 | Status: SHIPPED | OUTPATIENT
Start: 2021-05-24

## 2021-05-24 RX ORDER — AMLODIPINE BESYLATE 2.5 MG/1
2.5 TABLET ORAL DAILY
Qty: 90 TABLET | Refills: 1 | Status: SHIPPED | OUTPATIENT
Start: 2021-05-24

## 2021-05-24 RX ORDER — METOPROLOL SUCCINATE 100 MG/1
100 TABLET, EXTENDED RELEASE ORAL DAILY
Qty: 90 TABLET | Refills: 1 | Status: SHIPPED | OUTPATIENT
Start: 2021-05-24

## 2021-05-24 RX ORDER — VENLAFAXINE HYDROCHLORIDE 75 MG/1
CAPSULE, EXTENDED RELEASE ORAL
COMMUNITY
Start: 2021-05-17

## 2021-05-24 RX ORDER — ATORVASTATIN CALCIUM 40 MG/1
40 TABLET, FILM COATED ORAL DAILY
Qty: 90 TABLET | Refills: 1 | Status: SHIPPED | OUTPATIENT
Start: 2021-05-24

## 2021-05-24 NOTE — ASSESSMENT & PLAN NOTE
BMI Counseling: Body mass index is 27 98 kg/m²  The BMI is above normal  Nutrition recommendations include decreasing overall calorie intake, 3-5 servings of fruits/vegetables daily and moderation in carbohydrate intake  Exercise recommendations include exercising 3-5 times per week

## 2021-05-24 NOTE — PROGRESS NOTES
ADULT [facility]NUAL [facility]    NAME: Rosa Dias  AGE: 50 y o  SEX: female  : 1965     DATE: 2021     Assessment and Plan:     Problem List Items Addressed This Visit        Cardiovascular and Mediastinum    Essential hypertension       Controlled  Continue amlodipine 2 5 daily and metoprolol  milligrams daily  Will continue to monitor  Relevant Medications    amLODIPine (NORVASC) 2 5 mg tablet    metoprolol succinate (TOPROL-XL) 100 mg 24 hr tablet    Other Relevant Orders    TSH, 3rd generation with Free T4 reflex       Genitourinary    Overactive bladder       Adequately controlled per patient  Continue Sanctura 20 milligrams daily  Relevant Medications    trospium chloride (SANCTURA) 20 mg tablet       Other    Anxiety       Patient notes that this has uncontrolled recently  Her medication was recently changed about a week ago by psychiatrist   She notes that she been skin picking and having panic recently  We reviewed that she may need some more time for the medication changed to take effect  She was encouraged to keep her psychiatrist updated so additional changes can be made if needed  Bipolar 1 disorder, mixed ([facility] [address] )       Patient continues to work with Psychiatry  She recently had medication changes about a week ago  She is experiencing uncontrolled anxiety  She was encouraged to stay in close contact with additional changes could be made needed  Relevant Medications    venlafaxine (EFFEXOR-XR) 75 mg 24 hr capsule    Hyperlipidemia       Currently on atorvastatin 40 milligrams daily  Recheck lipid as ordered today  Relevant Medications    atorvastatin (LIPITOR) 40 mg tablet    Other Relevant Orders    Lipid Panel with Direct LDL reflex    Tobacco abuse     Improving  She is working on decreasing gradually      Tobacco Cessation Counseling: Tobacco cessation counseling and education was provided  The patient is sincerely urged to quit consumption of tobacco  She is ready to quit tobacco  The numerous health risks of tobacco consumption were discussed  She will continue to gradually decrease use  Prediabetes       A1c in September was 5 7%  Encouraged to limit carbs  Recheck with labs as ordered today  Relevant Orders    Basic metabolic panel    Hemoglobin A1C    Overweight (BMI 25 0-29  9)     BMI Counseling: Body mass index is 27 98 kg/m²  The BMI is above normal  Nutrition recommendations include decreasing overall calorie intake, 3-5 servings of fruits/vegetables daily and moderation in carbohydrate intake  Exercise recommendations include exercising 3-5 times per week  Vitamin D deficiency     Recheck with labs as ordered  Relevant Orders    Vitamin D 25 hydroxy      Other Visit Diagnoses     Annual physical exam    -  Primary    Encounter for Papanicolaou smear for cervical cancer screening        Relevant Orders    Ambulatory referral to Obstetrics / Gynecology    Screening for cervical cancer        Relevant Orders    Ambulatory referral to Obstetrics / Gynecology    Cloudy urine        Could not provide sample while in office so gave slip to check at lab  Relevant Orders    UA w Reflex to Microscopic w Reflex to Culture -Lab Collect          Immunizations and preventive care screenings were discussed with patient today  Appropriate education was printed on patient's after visit summary  Counseling:  Dental Health: discussed importance of regular dental visits  · Exercise: the importance of regular exercise/physical activity was discussed  Recommend exercise 3-5 times per week for at least 30 minutes  Return in about 6 months (around 11/24/2021) for Recheck       Chief Complaint:     Chief Complaint   Patient presents with    Physical Exam      History of Present Illness:     Adult Annual Physical   Patient here for a comprehensive physical exam  The patient reports problems - as  below  The patient reports that current blood pressure medications include amlodipine 2 5 mg daily and metoprolol 100 mg daily  Blood pressure readings at home are not checked  The patient denies symptoms of poor control such as chest pain, shortness of breath, leg swelling, headaches, or dizziness  She notes that her vision has been off and had last eye exam less than a year ago  She notes that she can see blurriness  Her overactive bladder has been controlled with Sanctura 20 mg once daily  She did not follow-up with Urology  She is still smoking 1/4 ppd - was previously 1/2 ppd  She is interested in quitting but notes that she is gradually cutting back  The patient continues with atorvastatin 40 milligrams daily  Last LDL was 71 in 8/2019  The patient denies myalgias  The patient reports using trazodone 150 mg at night for sleep  Nightly sleep averages about 6-10 hours per night  Upon wakening, the patient reports feeling rested as long as she gets 10 hours  She notes that she had a bad ear bleeding episode - went to ER and was told that she was told that she had a scratch but denies putting anything in ear - woke up bleeding     Diet and Physical Activity  · Diet/Nutrition: poor diet and limited fruits/vegetables  · Exercise: no formal exercise  Depression Screening  PHQ-9 Depression Screening    PHQ-9:   Frequency of the following problems over the past two weeks:           General Health  · Sleep: as above  · Hearing: normal - bilateral   · Vision: as above - will be scheduling  · Dental: will be scheduling  /GYN Health  · Patient is: postmenopausal  · Last menstrual period: age 54       Review of Systems:     Review of Systems   Constitutional: Negative for chills and fever  HENT: Negative for rhinorrhea and sore throat  Eyes: Negative for visual disturbance     Respiratory: Negative for cough, shortness of breath and wheezing  Cardiovascular: Negative for chest pain, palpitations and leg swelling  Gastrointestinal: Positive for constipation and diarrhea  Negative for abdominal pain, blood in stool, nausea and vomiting  Heartburn, notes variation in stool consistency    Endocrine: Negative for polydipsia and polyuria  Genitourinary: Negative for dysuria and frequency  Cloudy urine   Musculoskeletal: Positive for arthralgias (stiffness in injured joint)  Negative for myalgias  Skin: Negative for rash  Neurological: Negative for dizziness, syncope and headaches  Hematological: Bruises/bleeds easily (as noted in HPI - but now recently stopped and hoping to not have to restart)  Psychiatric/Behavioral: Negative for dysphoric mood  The patient is nervous/anxious         Past Medical History:     Past Medical History:   Diagnosis Date    Abdominal mass, LLQ (left lower quadrant)     resolved 06/19/2015    Anemia     Anxiety     Bipolar disorder ([facility] [address] )     Cervicalgia     resolved 02/18/2015    Depression     with anxiety per allscripts    Esophageal reflux     resolved 01/12/2015    Fracture of humerus     s/p ORIF R humerus resolved 07/18/2017    GERD (gastroesophageal reflux disease)     Headache     Heartburn     last assessed 10/11/2012    History of surgery on left wrist 04/01/2021    Hypertension     Migraine     Polyuria     resolved 07/18/2017    Skull fracture ([facility] [address] ) 2001    s/p trauma 2008 s/p fall at work    Subdural hemorrhage ([facility] [address] ) 2008    skull fracture    Urinary frequency     resolved 02/18/2015    Urinary incontinence     resolved 07/18/2017    Vertigo       Past Surgical History:     Past Surgical History:   Procedure Laterality Date    ABDOMINAL SURGERY      abdominal mass    BLADDER SUSPENSION      BONE BIOPSY      open    BREAST LUMPECTOMY      age 34 benign per allscripts    DILATION AND CURETTAGE OF UTERUS      x2 due to bleeding per allscripts    ELBOW SURGERY Right     per allscripts    ENDOMETRIAL ABLATION      vaginal lesion(s) hydrothermal ablation     HUMERUS FRACTURE SURGERY      s/p fall with plate    HYSTEROSCOPY W/ ENDOMETRIAL ABLATION      OTHER SURGICAL HISTORY      surgical treatment for  age 14    Daphnie Giron OVARIAN CYST REMOVAL Left     OVARY SURGERY Right     cystectomy per allscripts    IA COLONOSCOPY FLX DX W/COLLJ SPEC WHEN PFRMD N/A 11/15/2017    Procedure: COLONOSCOPY;  Surgeon: Suzette Augustin MD;  Location: [facility];   Service: Gastroenterology    SHOULDER SURGERY Right     TUBAL LIGATION      URETHRAL SLING      mid per allscripts      Social History:        Social History     Socioeconomic History    Marital status: /Civil Union     Spouse name: None    Number of children: None    Years of education: None    Highest education level: None   Occupational History    None   Social Needs    Financial resource strain: None    Food insecurity     Worry: None     Inability: None    Transportation needs     Medical: None     Non-medical: None   Tobacco Use    Smoking status: Current Some Day Smoker     Packs/day: 0 25    Smokeless tobacco: Never Used    Tobacco comment: active smoker per allscripts,   Substance and Sexual Activity    Alcohol use: Yes     Frequency: Monthly or less     Comment: social, moderate per allscripts, special occasions    Drug use: No    Sexual activity: Yes     Partners: Male   Lifestyle    Physical activity     Days per week: None     Minutes per session: None    Stress: None   Relationships    Social connections     Talks on phone: None     Gets together: None     Attends Jainism service: None     Active member of club or organization: None     Attends meetings of clubs or organizations: None     Relationship status: None    Intimate partner violence     Fear of current or ex partner: None     Emotionally abused: None     Physically abused: None Forced sexual activity: None   Other Topics Concern    None   Social History Narrative    Caffeine use    Lives with adult children          Family History:     Family History   Problem Relation Age of Onset    Hypertension Mother     Breast cancer Mother 43    Asthma Father     Coronary artery disease Father     Diabetes Father     Hypertension Father     Diabetes type II Father     Breast cancer Sister 43    Diabetes Other     Heart disease Other     Hypertension Other     Breast cancer Maternal Aunt 72    No Known Problems Daughter       Current Medications:     Current Outpatient Medications   Medication Sig Dispense Refill    albuterol (PROVENTIL HFA,VENTOLIN HFA) 90 mcg/act inhaler Inhale 2 puffs every 6 (six) hours as needed for wheezing or shortness of breath 1 Inhaler 0    amLODIPine (NORVASC) 2 5 mg tablet Take 1 tablet (2 5 mg total) by mouth daily 90 tablet 1    atorvastatin (LIPITOR) 40 mg tablet Take 1 tablet (40 mg total) by mouth daily 90 tablet 1    carBAMazepine (TEGretol) 200 mg tablet Take 200 mg by mouth daily        clonazePAM (KlonoPIN) 2 mg tablet Take 2 mg by mouth daily       ergocalciferol (VITAMIN D2) 50,000 units Take 1 capsule (50,000 Units total) by mouth once a week 12 capsule 0    metoprolol succinate (TOPROL-XL) 100 mg 24 hr tablet Take 1 tablet (100 mg total) by mouth daily 90 tablet 1    traZODone (DESYREL) 150 mg tablet Take 150 mg by mouth daily at bedtime      trospium chloride (SANCTURA) 20 mg tablet Take 1 tablet (20 mg total) by mouth 2 (two) times a day 180 tablet 1    venlafaxine (EFFEXOR-XR) 75 mg 24 hr capsule       XARELTO 20 MG tablet Take 20 mg by mouth daily        No current facility-administered medications for this visit  Allergies:      Allergies   Allergen Reactions    Codeine GI Intolerance    Erythromycin GI Intolerance    Percolone [Oxycodone] GI Intolerance    Topiramate Other (See Comments)     vomitting    Lamotrigine Rash     Pt claims she does not have an allergy to lamotrigine      Physical Exam:     /78   Pulse 84   Temp 97 8 °F (36 6 °C) (Temporal)   Ht 5' 2" (1 575 m)   Wt 69 4 kg (153 lb)   SpO2 95%   BMI 27 98 kg/m²     Physical Exam  Vitals signs reviewed  Constitutional:       General: She is not in acute distress  Appearance: Normal appearance  She is well-developed  She is not ill-appearing  HENT:      Head: Normocephalic and atraumatic  Right Ear: Tympanic membrane, ear canal and external ear normal       Left Ear: External ear normal       Ears:      Comments: Dry blood in the left ear canal 1/2 way to TM  Eyes:      Conjunctiva/sclera: Conjunctivae normal       Pupils: Pupils are equal, round, and reactive to light  Neck:      Musculoskeletal: Normal range of motion and neck supple  Thyroid: No thyromegaly  Cardiovascular:      Rate and Rhythm: Normal rate and regular rhythm  Pulses: Normal pulses  Heart sounds: Normal heart sounds  No murmur  Pulmonary:      Effort: Pulmonary effort is normal       Breath sounds: Normal breath sounds  No wheezing, rhonchi or rales  Abdominal:      General: Bowel sounds are normal  There is no distension  Palpations: Abdomen is soft  There is no mass  Tenderness: There is no abdominal tenderness  Musculoskeletal:      Right lower leg: No edema  Left lower leg: No edema  Lymphadenopathy:      Cervical: No cervical adenopathy  Skin:     General: Skin is warm and dry  Findings: No rash  Neurological:      Mental Status: She is alert  Sensory: No sensory deficit  Comments: 5/5 strength in UE and LE   Psychiatric:         Mood and Affect: Mood normal          Behavior: Behavior normal          Thought Content:  Thought content normal          Judgment: Judgment normal           Lissette Inocencio, PA-C  [facility]

## 2021-05-24 NOTE — ASSESSMENT & PLAN NOTE
Improving  She is working on decreasing gradually  Tobacco Cessation Counseling: Tobacco cessation counseling and education was provided  The patient is sincerely urged to quit consumption of tobacco  She is ready to quit tobacco  The numerous health risks of tobacco consumption were discussed  She will continue to gradually decrease use

## 2021-05-24 NOTE — ASSESSMENT & PLAN NOTE
Controlled  Continue amlodipine 2 5 daily and metoprolol  milligrams daily  Will continue to monitor

## 2021-05-24 NOTE — ASSESSMENT & PLAN NOTE
Patient notes that this has uncontrolled recently  Her medication was recently changed about a week ago by psychiatrist   She notes that she been skin picking and having panic recently  We reviewed that she may need some more time for the medication changed to take effect  She was encouraged to keep her psychiatrist updated so additional changes can be made if needed

## 2021-05-24 NOTE — PATIENT INSTRUCTIONS

## 2021-05-24 NOTE — ASSESSMENT & PLAN NOTE
Patient continues to work with Psychiatry  She recently had medication changes about a week ago  She is experiencing uncontrolled anxiety  She was encouraged to stay in close contact with additional changes could be made needed

## 2021-06-07 ENCOUNTER — TELEPHONE (OUTPATIENT)
Dept: FAMILY MEDICINE CLINIC | Facility: CLINIC | Age: 56
End: 2021-06-07

## 2021-06-07 NOTE — TELEPHONE ENCOUNTER
Called the patient back to offer her an appointment at 4:20 pm, per Luci Pastor  Patient declined and states multiple frustrations:    1  The timing of my call-back to return her voicemail  Patient states that the operation of the office is unfair to staff, and that we (staff) "work for Urology, too" and "you guys didn't even know that til you got there " Patient is concerned I am getting back to her voicemail from [address] [place] [address] at 1pm for an appointment need, but states she is not angry with me, she is concerned there are issues with the office  2  Patient expresses frustration of having to switch from Dr Nixon Roberto to Luci Pastor, although she states she has been pleased with [facility]'s care and loves seeing her  Patient is upset that Dr Nixon Roberto is not able to see patients as often d/t his, as she states, "political affairs" with [facility]  She believes the staff are struggling as well  Patient repeatedly asked me to let Luci Pastor know she appreciates everything and her anger is not expressed towards her at all  Patient deescalated by end of call

## 2021-06-07 NOTE — TELEPHONE ENCOUNTER
Called patient to return voicemail requesting an appointment with Pablo Watters for bites all over her body that have been itchy and are being scratched open  Offered patient appointment with another physician this week as Pablo Watters is booked, and patient expresses frustration with the timing of call-back and refuses to see another physician  Patient stated she will go somewhere else and ended phone call

## 2021-06-07 NOTE — TELEPHONE ENCOUNTER
Patient was called an was offer the 4:20 appt  The pt was upset and started staying that "Dr Kim is an Idiot" and that she didn't want to come into the office ever again  She stated that she left a message and was upset because chao didn't get to her in time  She stated that she will no longer come into the office  The pt stated she will be going to [facility] to continue her care  Pt stated that she didn't like that they couldn't get her to see chao sooner and Dr Kim never has any appointments  She then stated how trouble the service of the office was because Dr Kim became a [facility] Doctor and then he ended up moving and now is combined with urology  She stated "And nobody couldn't get back to me, is it stupid" I apologized to her and try to get her into the offic ein regards of her message she then stated "NO" I will not be coming into the office any more   I told her that I will let Amilcar Seals know about her concerns

## 2021-08-10 ENCOUNTER — DOCUMENTATION (OUTPATIENT)
Dept: GYNECOLOGY | Facility: CLINIC | Age: 56
End: 2021-08-10

## 2021-08-10 DIAGNOSIS — R79.9 ABNORMAL BLOOD FINDINGS: Primary | ICD-10-CM

## 2021-08-10 NOTE — PROGRESS NOTES
Pt was in the ER due to abdominal pain  her Beta Hcg was 8,   She is not pregnant had a tubal many years ago  She had CT scan it was normal   Was told to  follow  up with GYN  She has not been seen since 2017 but didn't know if anything need to be done prior to making appt for yearly?      Please advise

## 2021-08-20 ENCOUNTER — APPOINTMENT (OUTPATIENT)
Dept: LAB | Age: 56
End: 2021-08-20
Payer: COMMERCIAL

## 2021-08-20 DIAGNOSIS — R79.9 ABNORMAL BLOOD FINDINGS: ICD-10-CM

## 2021-08-20 LAB — HCG SERPL QL: POSITIVE

## 2021-08-20 PROCEDURE — 36415 COLL VENOUS BLD VENIPUNCTURE: CPT

## 2021-08-20 PROCEDURE — 84703 CHORIONIC GONADOTROPIN ASSAY: CPT

## 2021-08-23 ENCOUNTER — TELEPHONE (OUTPATIENT)
Dept: GYNECOLOGY | Facility: CLINIC | Age: 56
End: 2021-08-23

## 2021-08-23 DIAGNOSIS — R79.9 ABNORMAL BLOOD FINDINGS: Primary | ICD-10-CM

## 2021-08-23 NOTE — TELEPHONE ENCOUNTER
Pt called and LM questioning her initial labwork  I CB pt and LM to have a quantitative HCG drawn and that she can also CB with any questions

## 2021-08-24 ENCOUNTER — APPOINTMENT (OUTPATIENT)
Dept: LAB | Age: 56
End: 2021-08-24
Payer: COMMERCIAL

## 2021-08-24 DIAGNOSIS — R79.9 ABNORMAL BLOOD FINDINGS: ICD-10-CM

## 2021-08-24 LAB — B-HCG SERPL-ACNC: 7 MIU/ML

## 2021-08-24 PROCEDURE — 36415 COLL VENOUS BLD VENIPUNCTURE: CPT

## 2021-08-24 PROCEDURE — 84702 CHORIONIC GONADOTROPIN TEST: CPT

## 2021-08-25 NOTE — TELEPHONE ENCOUNTER
Patient called in regards to her HCG quant  Made her aware to repeat and she said no  She does not want to wait until next week, she wants an appt   Please advise

## 2021-08-25 NOTE — TELEPHONE ENCOUNTER
Pt is not very nice  Appt made for GYN yearly 9/10   Advised pt again to make sure she does her HCG Q next week

## 2021-09-01 ENCOUNTER — APPOINTMENT (OUTPATIENT)
Dept: LAB | Age: 56
End: 2021-09-01
Payer: COMMERCIAL

## 2021-09-01 ENCOUNTER — TELEPHONE (OUTPATIENT)
Dept: GYNECOLOGY | Facility: CLINIC | Age: 56
End: 2021-09-01

## 2021-09-01 DIAGNOSIS — N91.2 AMENORRHEA: ICD-10-CM

## 2021-09-01 DIAGNOSIS — Z32.01 POSITIVE PREGNANCY TEST: ICD-10-CM

## 2021-09-01 LAB — B-HCG SERPL-ACNC: 8 MIU/ML

## 2021-09-01 PROCEDURE — 36415 COLL VENOUS BLD VENIPUNCTURE: CPT

## 2021-09-01 PROCEDURE — 84702 CHORIONIC GONADOTROPIN TEST: CPT

## 2021-09-01 NOTE — TELEPHONE ENCOUNTER
Lab called and states patient is there for repeat HCG and there is no script in computer  Please put order in ASAP

## 2021-09-02 ENCOUNTER — TELEPHONE (OUTPATIENT)
Dept: GYNECOLOGY | Facility: CLINIC | Age: 56
End: 2021-09-02

## 2021-09-02 ENCOUNTER — DOCUMENTATION (OUTPATIENT)
Dept: GYNECOLOGY | Facility: CLINIC | Age: 56
End: 2021-09-02

## 2021-09-02 NOTE — TELEPHONE ENCOUNTER
Patient called and is highly anxious about her blood test results  She is wondering if she should have more blood work done or should she wait til next appointment? She wants answers ASAP

## 2021-09-09 ENCOUNTER — DOCUMENTATION (OUTPATIENT)
Dept: GYNECOLOGY | Facility: CLINIC | Age: 56
End: 2021-09-09

## 2021-09-09 LAB — MISCELLANEOUS LAB TEST RESULT: NORMAL

## 2021-09-09 NOTE — PROGRESS NOTES
Pt called looking for resutls of labs from [employer] (under media) please advise    [phone]    Pt has appt today will be discussed  Mari Ramírez

## 2021-09-10 ENCOUNTER — ANNUAL EXAM (OUTPATIENT)
Dept: GYNECOLOGY | Facility: CLINIC | Age: 56
End: 2021-09-10
Payer: COMMERCIAL

## 2021-09-10 ENCOUNTER — DOCUMENTATION (OUTPATIENT)
Dept: GYNECOLOGY | Facility: CLINIC | Age: 56
End: 2021-09-10

## 2021-09-10 VITALS
DIASTOLIC BLOOD PRESSURE: 78 MMHG | SYSTOLIC BLOOD PRESSURE: 122 MMHG | WEIGHT: 161 LBS | BODY MASS INDEX: 29.63 KG/M2 | HEART RATE: 98 BPM | HEIGHT: 62 IN

## 2021-09-10 DIAGNOSIS — Z01.419 ENCOUNTER FOR GYNECOLOGICAL EXAMINATION WITH PAPANICOLAOU SMEAR OF CERVIX: Primary | ICD-10-CM

## 2021-09-10 DIAGNOSIS — N32.81 OAB (OVERACTIVE BLADDER): ICD-10-CM

## 2021-09-10 DIAGNOSIS — Z12.4 ENCOUNTER FOR PAPANICOLAOU SMEAR FOR CERVICAL CANCER SCREENING: ICD-10-CM

## 2021-09-10 DIAGNOSIS — Z12.4 SCREENING FOR CERVICAL CANCER: ICD-10-CM

## 2021-09-10 DIAGNOSIS — K31.7 HYPERPLASTIC POLYPS OF STOMACH: Primary | ICD-10-CM

## 2021-09-10 PROCEDURE — G0145 SCR C/V CYTO,THINLAYER,RESCR: HCPCS | Performed by: OBSTETRICS & GYNECOLOGY

## 2021-09-10 PROCEDURE — 4004F PT TOBACCO SCREEN RCVD TLK: CPT | Performed by: OBSTETRICS & GYNECOLOGY

## 2021-09-10 PROCEDURE — 3008F BODY MASS INDEX DOCD: CPT | Performed by: OBSTETRICS & GYNECOLOGY

## 2021-09-10 PROCEDURE — 99386 PREV VISIT NEW AGE 40-64: CPT | Performed by: OBSTETRICS & GYNECOLOGY

## 2021-09-10 NOTE — PROGRESS NOTES
Medication given Kalyn Campa is not covered and is very expensive $1,500   Can we try her on soemthing else?

## 2021-09-10 NOTE — PROGRESS NOTES
Assessment/Plan:         Diagnoses and all orders for this visit:    Encounter for gynecological examination with Papanicolaou smear of cervix  -     Liquid-based pap, screening    OAB (overactive bladder); discontinue Tropsium  Start  Gemtesa      Colon cancer screening  Patient has been referred back to [address] [place] [address] for repeat colonoscopy    False-positive pregnancy test; presently in discussion with lab supervisor for further testing for heterophilic antibodies    Subjective:      Patient ID: Abigail Soriano is a 49 y o  female  HPI  patient presents to the office for annual examination and a follow-up  Patient was seen in the emergency room at [facility] on August 7th complaining of right upper quadrant abdominal pain  This was attributed to musculoskeletal in origin  During that workup for a quantitative hCG was obtained at a level of 8 mIU/mL  This was repeated again on August 24th and was returned with a level of 7  After discussion with lab supervisor had a fatigue antibodies were checked and the most recent level again came back positive at a level of 8  The patient has had a prior endometrial ablation and a prior tubal   She denies any vaginal irritation, burning, discharge or bleeding  Denies any abdominal pain     She also presents complaining of urinary frequency and urgency  She is presently on Tropsium with no relief  She denies any stress urinary incontinence  She has had a prior mid urethral sling  Colon cancer screening  Colonoscopy in November of 2017    Tubular adenoma identified in the descending colon with 2 hyperplastic polyps in the sigmoid colon    The following portions of the patient's history were reviewed and updated as appropriate:   She  has a past medical history of Abdominal mass, LLQ (left lower quadrant), Anemia, Anxiety, Bipolar disorder ([address] ), Cervicalgia, Depression, Esophageal reflux, Fracture of humerus, GERD (gastroesophageal reflux disease), Headache, Heartburn, History of surgery on left wrist (04/01/2021), Hypertension, Migraine, Polyuria, Skull fracture ([facility] ) (2001), Subdural hemorrhage ([place] [address] ) (2008), Urinary frequency, Urinary incontinence, and Vertigo  She   Patient Active Problem List    Diagnosis Date Noted    Hair loss 07/28/2020    Vitamin D deficiency 06/29/2020    Pulmonary embolism during treatment with long-term anticoagulation therapy ([place] [address] ) 05/04/2020    DVT (deep venous thrombosis) ([place] [address] ) 05/01/2020    Closed fracture of lateral portion of right tibial plateau 41/20/2927    Tear of left scapholunate ligament 07/18/2019    Occult closed fracture of scaphoid bone of left wrist 06/05/2019    Overweight (BMI 25 0-29 9) 05/21/2019    Excessive daytime sleepiness 04/26/2018    Thyromegaly 04/26/2018    Prediabetes 04/26/2018    Mammogram abnormal 01/17/2018    Cervical polyp 12/14/2017    Alternating constipation and diarrhea 11/07/2017    Back muscle spasm 10/31/2017    Vertigo 10/05/2017    Nephrolithiasis 09/19/2017    Abdominal pain of multiple sites 08/30/2017    Bloating 07/07/2017    Female pelvic pain 07/07/2017    Familial cancer of breast ([place] [address] ) 12/22/2016    Anxiety 02/24/2016    Bipolar 1 disorder, mixed ([place] [address] ) 02/24/2016    Tobacco abuse 02/24/2016    Anemia 06/19/2015    Stress incontinence, female 03/12/2015    Overactive bladder 01/20/2015    Chronic migraine without aura 06/30/2014    Breast pain 05/02/2014    Bipolar I disorder, single manic episode ([place] [address] ) 10/11/2012    Essential hypertension 10/11/2012    Hyperlipidemia 05/02/2012     She  has a past surgical history that includes Ovary surgery (Right); Dilation and curettage of uterus; Tubal ligation; Shoulder surgery (Right); Elbow surgery (Right); Hysteroscopy w/ endometrial ablation; Bladder suspension; Abdominal surgery; Breast lumpectomy; pr colonoscopy flx dx w/collj spec when pfrmd (N/A, 11/15/2017);  Endometrial ablation; Bone biopsy; Urethral sling; Ovarian cyst removal (Left); Other surgical history; and Humerus fracture surgery  Her family history includes Asthma in her father; Breast cancer (age of onset: 43) in her mother and sister; Breast cancer (age of onset: 72) in her maternal aunt; Coronary artery disease in her father; Diabetes in her father and other; Diabetes type II in her father; Heart disease in her other; Hypertension in her father, mother, and other; No Known Problems in her daughter  She  reports that she has been smoking  She has been smoking about 0 25 packs per day  She has never used smokeless tobacco  She reports current alcohol use  She reports that she does not use drugs  Current Outpatient Medications   Medication Sig Dispense Refill    amLODIPine (NORVASC) 2 5 mg tablet Take 1 tablet (2 5 mg total) by mouth daily 90 tablet 1    atorvastatin (LIPITOR) 40 mg tablet Take 1 tablet (40 mg total) by mouth daily 90 tablet 1    carBAMazepine (TEGretol) 200 mg tablet Take 200 mg by mouth daily        clonazePAM (KlonoPIN) 2 mg tablet Take 2 mg by mouth daily       metoprolol succinate (TOPROL-XL) 100 mg 24 hr tablet Take 1 tablet (100 mg total) by mouth daily 90 tablet 1    traZODone (DESYREL) 150 mg tablet Take 150 mg by mouth daily at bedtime      trospium chloride (SANCTURA) 20 mg tablet Take 1 tablet (20 mg total) by mouth 2 (two) times a day 180 tablet 1    venlafaxine (EFFEXOR-XR) 75 mg 24 hr capsule        No current facility-administered medications for this visit       Current Outpatient Medications on File Prior to Visit   Medication Sig    amLODIPine (NORVASC) 2 5 mg tablet Take 1 tablet (2 5 mg total) by mouth daily    atorvastatin (LIPITOR) 40 mg tablet Take 1 tablet (40 mg total) by mouth daily    carBAMazepine (TEGretol) 200 mg tablet Take 200 mg by mouth daily      clonazePAM (KlonoPIN) 2 mg tablet Take 2 mg by mouth daily     metoprolol succinate (TOPROL-XL) 100 mg 24 hr tablet Take 1 tablet (100 mg total) by mouth daily    traZODone (DESYREL) 150 mg tablet Take 150 mg by mouth daily at bedtime    trospium chloride (SANCTURA) 20 mg tablet Take 1 tablet (20 mg total) by mouth 2 (two) times a day    venlafaxine (EFFEXOR-XR) 75 mg 24 hr capsule     [DISCONTINUED] albuterol (PROVENTIL HFA,VENTOLIN HFA) 90 mcg/act inhaler Inhale 2 puffs every 6 (six) hours as needed for wheezing or shortness of breath    [DISCONTINUED] ergocalciferol (VITAMIN D2) 50,000 units Take 1 capsule (50,000 Units total) by mouth once a week    [DISCONTINUED] XARELTO 20 MG tablet Take 20 mg by mouth daily      No current facility-administered medications on file prior to visit  She is allergic to codeine, erythromycin, percolone [oxycodone], and topiramate       Review of Systems   Constitutional: Negative  HENT: Negative for sore throat and trouble swallowing  Gastrointestinal: Negative  Genitourinary: Positive for frequency and urgency  Negative for difficulty urinating, enuresis, hematuria, pelvic pain, vaginal bleeding and vaginal discharge  Objective:      /78 (BP Location: Right arm, Patient Position: Sitting, Cuff Size: Standard)   Pulse 98   Ht 5' 2" (1 575 m)   Wt 73 kg (161 lb)   BMI 29 45 kg/m²          Physical Exam  Vitals reviewed  Cardiovascular:      Rate and Rhythm: Normal rate and regular rhythm  Pulses: Normal pulses  Heart sounds: Normal heart sounds  No murmur heard  Pulmonary:      Effort: Pulmonary effort is normal  No respiratory distress  Breath sounds: Normal breath sounds  Chest:      Breasts:         Right: No swelling, bleeding, inverted nipple, mass, nipple discharge, skin change or tenderness  Left: No swelling, bleeding, inverted nipple, mass, nipple discharge, skin change or tenderness  Abdominal:      General: There is no distension  Palpations: Abdomen is soft  There is no mass  Tenderness:  There is no abdominal tenderness  There is no guarding or rebound  Hernia: No hernia is present  There is no hernia in the left inguinal area or right inguinal area  Genitourinary:     General: Normal vulva  Labia:         Right: No rash, tenderness or lesion  Left: No rash, tenderness or lesion  Vagina: Normal       Cervix: Normal       Uterus: Normal        Adnexa:         Right: No mass, tenderness or fullness  Left: No mass, tenderness or fullness  Musculoskeletal:      Cervical back: Normal range of motion and neck supple  No tenderness  Lymphadenopathy:      Cervical: No cervical adenopathy  Upper Body:      Right upper body: No supraclavicular, axillary or pectoral adenopathy  Left upper body: No supraclavicular, axillary or pectoral adenopathy  Lower Body: No right inguinal adenopathy  No left inguinal adenopathy  Neurological:      Mental Status: She is alert

## 2021-09-16 LAB
LAB AP GYN PRIMARY INTERPRETATION: NORMAL
Lab: NORMAL

## 2021-10-08 ENCOUNTER — DOCUMENTATION (OUTPATIENT)
Dept: GYNECOLOGY | Facility: CLINIC | Age: 56
End: 2021-10-08

## 2022-09-13 ENCOUNTER — TELEPHONE (OUTPATIENT)
Dept: GYNECOLOGY | Facility: CLINIC | Age: 57
End: 2022-09-13

## 2022-09-13 NOTE — TELEPHONE ENCOUNTER
Called and lm asking if patient wants to reschedule missed appointment today    Waiting on return call